# Patient Record
Sex: FEMALE | Race: WHITE | NOT HISPANIC OR LATINO | Employment: OTHER | URBAN - METROPOLITAN AREA
[De-identification: names, ages, dates, MRNs, and addresses within clinical notes are randomized per-mention and may not be internally consistent; named-entity substitution may affect disease eponyms.]

---

## 2024-01-19 ENCOUNTER — APPOINTMENT (OUTPATIENT)
Dept: RADIOLOGY | Facility: HOSPITAL | Age: 54
End: 2024-01-19
Payer: COMMERCIAL

## 2024-01-19 ENCOUNTER — HOSPITAL ENCOUNTER (EMERGENCY)
Facility: HOSPITAL | Age: 54
Discharge: HOME | End: 2024-01-19
Attending: EMERGENCY MEDICINE
Payer: COMMERCIAL

## 2024-01-19 VITALS
OXYGEN SATURATION: 97 % | BODY MASS INDEX: 23.34 KG/M2 | TEMPERATURE: 97.7 F | HEIGHT: 64 IN | DIASTOLIC BLOOD PRESSURE: 71 MMHG | WEIGHT: 136.69 LBS | SYSTOLIC BLOOD PRESSURE: 115 MMHG | RESPIRATION RATE: 18 BRPM | HEART RATE: 96 BPM

## 2024-01-19 DIAGNOSIS — M25.562 ACUTE PAIN OF LEFT KNEE: Primary | ICD-10-CM

## 2024-01-19 PROCEDURE — 99283 EMERGENCY DEPT VISIT LOW MDM: CPT | Performed by: EMERGENCY MEDICINE

## 2024-01-19 PROCEDURE — 73564 X-RAY EXAM KNEE 4 OR MORE: CPT | Mod: LT

## 2024-01-19 PROCEDURE — 2500000001 HC RX 250 WO HCPCS SELF ADMINISTERED DRUGS (ALT 637 FOR MEDICARE OP): Performed by: EMERGENCY MEDICINE

## 2024-01-19 RX ORDER — IBUPROFEN 600 MG/1
600 TABLET ORAL ONCE
Status: COMPLETED | OUTPATIENT
Start: 2024-01-19 | End: 2024-01-19

## 2024-01-19 RX ADMIN — IBUPROFEN 600 MG: 600 TABLET, FILM COATED ORAL at 16:17

## 2024-01-19 ASSESSMENT — PAIN DESCRIPTION - PAIN TYPE: TYPE: ACUTE PAIN

## 2024-01-19 ASSESSMENT — PAIN DESCRIPTION - DESCRIPTORS: DESCRIPTORS: ACHING;TENDER

## 2024-01-19 ASSESSMENT — PAIN SCALES - GENERAL
PAINLEVEL_OUTOF10: 6
PAINLEVEL_OUTOF10: 6

## 2024-01-19 ASSESSMENT — LIFESTYLE VARIABLES
EVER FELT BAD OR GUILTY ABOUT YOUR DRINKING: NO
REASON UNABLE TO ASSESS: NO
HAVE PEOPLE ANNOYED YOU BY CRITICIZING YOUR DRINKING: NO
EVER HAD A DRINK FIRST THING IN THE MORNING TO STEADY YOUR NERVES TO GET RID OF A HANGOVER: NO
HAVE YOU EVER FELT YOU SHOULD CUT DOWN ON YOUR DRINKING: NO

## 2024-01-19 ASSESSMENT — PAIN - FUNCTIONAL ASSESSMENT
PAIN_FUNCTIONAL_ASSESSMENT: 0-10
PAIN_FUNCTIONAL_ASSESSMENT: 0-10

## 2024-01-19 ASSESSMENT — COLUMBIA-SUICIDE SEVERITY RATING SCALE - C-SSRS
1. IN THE PAST MONTH, HAVE YOU WISHED YOU WERE DEAD OR WISHED YOU COULD GO TO SLEEP AND NOT WAKE UP?: NO
6. HAVE YOU EVER DONE ANYTHING, STARTED TO DO ANYTHING, OR PREPARED TO DO ANYTHING TO END YOUR LIFE?: NO
2. HAVE YOU ACTUALLY HAD ANY THOUGHTS OF KILLING YOURSELF?: NO

## 2024-01-19 ASSESSMENT — PAIN DESCRIPTION - ORIENTATION: ORIENTATION: LEFT

## 2024-01-19 ASSESSMENT — PAIN DESCRIPTION - LOCATION: LOCATION: KNEE

## 2024-01-19 NOTE — ED PROVIDER NOTES
HPI   Chief Complaint   Patient presents with    Knee Pain     No trauma / injury       The patient is a 53-year-old female who presents for evaluation of left knee pain.  The patient states that she has been having pain to the medial aspect of her left knee for the past week.  She initially did not recall any specific injury or specific sudden onset of the pain but later remembers that a week ago she stepped awkwardly over her cat to avoid stepping on her cat.  She thinks now that she may have twisted her knee somewhat.  The pain has been isolated to the medial aspect of the knee.  She states that the pain is worse when she is up walking around and when she bends the knee.  She is also noticed the pain is worse when she tries to straighten the knee.  She denies any pain above or below the knee.  No redness or swelling or increased warmth.  No fevers.  No other concerns or complaints.                          [place] Coma Scale Score: 15                  Patient History   Past Medical History:   Diagnosis Date    Acute bronchitis, unspecified 12/03/2018    Acute bronchitis due to infection    Acute upper respiratory infection, unspecified 09/11/2017    Upper respiratory infection, acute    Calculus of gallbladder without cholecystitis without obstruction 01/03/2018    Calculus of gallbladder without cholecystitis without obstruction    Cervicalgia 05/27/2016    Neck pain    Chronic obstructive pulmonary disease, unspecified (CMS/[facility]) 12/11/2018    Chronic obstructive pulmonary disease    Chronic rhinitis 06/04/2013    Rhinitis    Chronic sinusitis, unspecified     Sinusitis    Diarrhea, unspecified 08/25/2016    Acute diarrhea    Headache, unspecified 02/26/2013    Headache    Other conditions influencing health status 06/04/2013    Liver, Stomach, Or Bowel Disease    Other conditions influencing health status     History Of ___ Previous Pregnancies    Other conditions influencing health status     History Of ___  Previous Pregnancies    Other conditions influencing health status     Nephrolithiasis    Other intervertebral disc displacement, lumbar region     Lumbar herniated disc    Perforation of intestine (nontraumatic) (CMS/HCC)     Colon perforation    Periapical abscess without sinus 01/07/2019    Dental infection    Personal history of diseases of the skin and subcutaneous tissue     History of hyperkeratosis of skin    Personal history of other diseases of the digestive system 12/12/2013    History of constipation    Personal history of other diseases of the digestive system 06/04/2013    History of ulcerative colitis    Personal history of other diseases of the musculoskeletal system and connective tissue     History of low back pain    Personal history of other diseases of the musculoskeletal system and connective tissue 05/04/2013    History of osteoarthritis    Personal history of other diseases of the respiratory system 07/13/2017    History of acute pharyngitis    Personal history of other diseases of the respiratory system 12/03/2018    History of acute bronchitis with bronchospasm    Personal history of other diseases of the respiratory system 02/06/2016    History of acute bronchitis    Personal history of other infectious and parasitic diseases 08/26/2014    History of herpes simplex infection    Personal history of other mental and behavioral disorders     History of depression    Personal history of other mental and behavioral disorders 05/04/2013    History of obsessive compulsive disorder    Personal history of other specified conditions     History of fatigue    Personal history of other specified conditions 06/04/2013    History of abnormal weight loss    Rheumatoid arthritis, unspecified (CMS/HCC)     Rheumatoid arthritis    Urinary tract infection, site not specified 11/28/2017    Acute UTI     Past Surgical History:   Procedure Laterality Date    BREAST LUMPECTOMY  09/12/2013    Breast Surgery  Lumpectomy     SECTION, CLASSIC  10/07/2013     Section    CHOLECYSTECTOMY  2018    Cholecystectomy    COLON SURGERY  2013    Colon Surgery    EXPLORATORY LAPAROTOMY  10/07/2013    Exploratory Laparotomy    HYSTEROSCOPY  2013    Hysteroscopy With Endometrial Ablation    OTHER SURGICAL HISTORY  2013    Tubal Ligation During  Section    OTHER SURGICAL HISTORY  2013    Axillary Lymphadenectomy    OTHER SURGICAL HISTORY  10/07/2013    Biopsy Endometrial, Without Cervical Dilation    OTHER SURGICAL HISTORY  2013    Gastrointestinal Surgery    SMALL INTESTINE SURGERY  10/07/2013    Small Bowel Resection    TONSILLECTOMY  2013    Tonsillectomy With Adenoidectomy    VARICOSE VEIN SURGERY  2013    Varicose Vein Ligation     No family history on file.  Social History     Tobacco Use    Smoking status: Not on file    Smokeless tobacco: Not on file   Substance Use Topics    Alcohol use: Not on file    Drug use: Not on file       Physical Exam   ED Triage Vitals [24 1422]   Temp Heart Rate Respirations BP   36.5 °C (97.7 °F) 94 20 123/79      Pulse Ox Temp Source Heart Rate Source Patient Position   98 % Oral Brachial Sitting      BP Location FiO2 (%)     Right arm --       Physical Exam  Vitals and nursing note reviewed.   Constitutional:       General: She is not in acute distress.     Appearance: Normal appearance.   HENT:      Head: Normocephalic and atraumatic.      Mouth/Throat:      Mouth: Mucous membranes are moist.      Pharynx: Oropharynx is clear. No oropharyngeal exudate or posterior oropharyngeal erythema.   Eyes:      Extraocular Movements: Extraocular movements intact.      Conjunctiva/sclera: Conjunctivae normal.      Pupils: Pupils are equal, round, and reactive to light.   Cardiovascular:      Rate and Rhythm: Normal rate and regular rhythm.      Heart sounds: No murmur heard.     Comments: Pulses are palpable at both dorsalis pedis  locations.  Pulmonary:      Effort: Pulmonary effort is normal.      Breath sounds: Normal breath sounds. No wheezing, rhonchi or rales.   Abdominal:      General: Abdomen is flat. There is no distension.      Palpations: Abdomen is soft.      Tenderness: There is no abdominal tenderness.   Musculoskeletal:      Cervical back: Normal range of motion and neck supple. No rigidity or tenderness.      Comments: Examination of left lower extremity reveals focal tenderness with palpation to the medial aspect of the knee.  There is no thigh or calf tenderness.  Examination of the knee reveals no redness or increased warmth.  No swelling or palpable effusion.  The patient is most comfortable with her knee flexed at about 80 degrees of flexion.  She allows flexion to about 120 degrees of flexion but does not allow full extension due to pain.  There is no ligamentous instability detected.  The extensor mechanism is intact.   Lymphadenopathy:      Cervical: No cervical adenopathy.   Skin:     General: Skin is warm and dry.      Findings: No rash.   Neurological:      Mental Status: She is alert and oriented to person, place, and time.      Cranial Nerves: No cranial nerve deficit.      Sensory: No sensory deficit.      Motor: No weakness.   Psychiatric:         Mood and Affect: Mood normal.         Behavior: Behavior normal.         ED Course & MDM   Diagnoses as of 01/19/24 8147   Acute pain of left knee       Medical Decision Making  The patient was given a dose of ibuprofen.  She was sent for x-rays of the left knee.  X-rays were read by the radiologist and show no acute pathologic findings.    Findings are consistent with nonspecific left knee pain.  I do not suspect septic arthritis.  There is no indication of an infectious etiology or vascular compromise.  Based on the location of her pain and the history of awkwardly stepping over her cat, I suspect that she may have a MCL sprain.  The patient was placed in knee  immobilizer brace to be worn for comfort.  She does not feel that she needs crutches.  She will be discharged home with instructions to take ibuprofen over-the-counter.  I recommended rest, ice and elevation.  She was given an orthopedic referral for follow-up in 3 to 4 days and encouraged to return if symptoms worsen in any way.        Procedure  Procedures     Immanuel Aiken, DO  01/19/24 4909

## 2024-01-19 NOTE — DISCHARGE INSTRUCTIONS
Rest, ice and elevation.    Wear knee immobilizer brace for comfort.    Ibuprofen over-the-counter.    Follow-up with the specialist below in 3 to 4 days.

## 2025-02-17 ENCOUNTER — APPOINTMENT (OUTPATIENT)
Dept: PRIMARY CARE | Facility: CLINIC | Age: 55
End: 2025-02-17
Payer: COMMERCIAL

## 2025-02-17 VITALS
SYSTOLIC BLOOD PRESSURE: 126 MMHG | BODY MASS INDEX: 22.36 KG/M2 | HEIGHT: 64 IN | DIASTOLIC BLOOD PRESSURE: 70 MMHG | WEIGHT: 131 LBS

## 2025-02-17 DIAGNOSIS — Z13.820 ENCOUNTER FOR SCREENING FOR OSTEOPOROSIS: ICD-10-CM

## 2025-02-17 DIAGNOSIS — Z12.11 ENCOUNTER FOR SCREENING COLONOSCOPY: ICD-10-CM

## 2025-02-17 DIAGNOSIS — Z13.220 LIPID SCREENING: ICD-10-CM

## 2025-02-17 DIAGNOSIS — K21.9 GASTROESOPHAGEAL REFLUX DISEASE WITHOUT ESOPHAGITIS: ICD-10-CM

## 2025-02-17 DIAGNOSIS — M77.8 TENDINITIS OF THUMB: ICD-10-CM

## 2025-02-17 DIAGNOSIS — S62.002D: ICD-10-CM

## 2025-02-17 DIAGNOSIS — Z78.0 POSTMENOPAUSAL: ICD-10-CM

## 2025-02-17 DIAGNOSIS — C50.411 MALIGNANT NEOPLASM OF UPPER-OUTER QUADRANT OF RIGHT FEMALE BREAST, UNSPECIFIED ESTROGEN RECEPTOR STATUS: ICD-10-CM

## 2025-02-17 DIAGNOSIS — Z71.89 CARDIAC RISK COUNSELING: ICD-10-CM

## 2025-02-17 DIAGNOSIS — M19.90 ARTHRITIS: Primary | ICD-10-CM

## 2025-02-17 DIAGNOSIS — Z00.00 HEALTHCARE MAINTENANCE: ICD-10-CM

## 2025-02-17 DIAGNOSIS — M05.711 RHEUMATOID ARTHRITIS INVOLVING RIGHT SHOULDER WITH POSITIVE RHEUMATOID FACTOR (MULTI): ICD-10-CM

## 2025-02-17 DIAGNOSIS — F17.200 SMOKING: ICD-10-CM

## 2025-02-17 DIAGNOSIS — M81.0 AGE RELATED OSTEOPOROSIS: ICD-10-CM

## 2025-02-17 DIAGNOSIS — R53.83 OTHER FATIGUE: ICD-10-CM

## 2025-02-17 PROCEDURE — 99204 OFFICE O/P NEW MOD 45 MIN: CPT | Performed by: INTERNAL MEDICINE

## 2025-02-17 PROCEDURE — 3008F BODY MASS INDEX DOCD: CPT | Performed by: INTERNAL MEDICINE

## 2025-02-17 PROCEDURE — 4004F PT TOBACCO SCREEN RCVD TLK: CPT | Performed by: INTERNAL MEDICINE

## 2025-02-17 RX ORDER — OMEPRAZOLE 40 MG/1
40 CAPSULE, DELAYED RELEASE ORAL
Qty: 30 CAPSULE | Refills: 0 | Status: SHIPPED | OUTPATIENT
Start: 2025-02-17 | End: 2026-02-17

## 2025-02-17 ASSESSMENT — ENCOUNTER SYMPTOMS
LOSS OF SENSATION IN FEET: 0
DEPRESSION: 0
OCCASIONAL FEELINGS OF UNSTEADINESS: 0

## 2025-02-18 ENCOUNTER — HOSPITAL ENCOUNTER (OUTPATIENT)
Dept: RADIOLOGY | Facility: CLINIC | Age: 55
Discharge: HOME | End: 2025-02-18
Payer: COMMERCIAL

## 2025-02-18 DIAGNOSIS — Z13.820 ENCOUNTER FOR SCREENING FOR OSTEOPOROSIS: ICD-10-CM

## 2025-02-18 DIAGNOSIS — M81.0 AGE RELATED OSTEOPOROSIS: ICD-10-CM

## 2025-02-18 PROCEDURE — 77080 DXA BONE DENSITY AXIAL: CPT | Performed by: RADIOLOGY

## 2025-02-18 PROCEDURE — 77080 DXA BONE DENSITY AXIAL: CPT

## 2025-02-18 NOTE — PROGRESS NOTES
"Subjective   Patient ID: Marlys Toussaint is a 54 y.o. female who presents for Follow-up (Various conditions).    Marlys Toussaint today came here to establish new physician.  Dr. Barry ______ retired, so she came to see me.  She has multiple medical issues:  1. Epigastric pain, GERD symptoms.  2. Arthritis, bone and joint arthritis pain.  3. She has left thumb pain, on and off it is hurting.  4. Arthritis is bothering her.  She came for follow-up on various conditions.    IMMUNIZATION: The patient will need all the shots.    I have personally reviewed the patient's Past Medical History, Medications, Allergies, Social History, and Family History in the EMR.    Review of Systems   All other systems reviewed and are negative.  The patient never had a stroke.  No heart attack.  No diabetes.    Objective   /70   Ht 1.626 m (5' 4\")   Wt 59.4 kg (131 lb)   BMI 22.49 kg/m²     Physical Exam  Vitals reviewed.   HENT:      Right Ear: Tympanic membrane, ear canal and external ear normal.      Left Ear: Tympanic membrane, ear canal and external ear normal.   Eyes:      General: No scleral icterus.     Pupils: Pupils are equal, round, and reactive to light.   Neck:      Vascular: No carotid bruit.   Cardiovascular:      Heart sounds: Normal heart sounds, S1 normal and S2 normal. No murmur heard.     No friction rub.   Pulmonary:      Effort: Pulmonary effort is normal.      Breath sounds: Normal breath sounds and air entry.   Chest:      Comments: BREAST: Deferred by the patient.  Abdominal:      Palpations: There is no hepatomegaly, splenomegaly or mass.   Genitourinary:     Comments: VAGINAL: Deferred by the patient.  RECTAL: Deferred by the patient.  Musculoskeletal:         General: No swelling or deformity. Normal range of motion.      Cervical back: Neck supple.      Right lower leg: No edema.      Left lower leg: No edema.   Lymphadenopathy:      Cervical: No cervical adenopathy.      Upper Body:      Right upper body: No " axillary adenopathy.      Left upper body: No axillary adenopathy.      Lower Body: No right inguinal adenopathy. No left inguinal adenopathy.   Neurological:      Mental Status: She is oriented to person, place, and time.      Cranial Nerves: Cranial nerves 2-12 are intact. No cranial nerve deficit.      Sensory: No sensory deficit.      Motor: Motor function is intact. No weakness.      Gait: Gait is intact.      Deep Tendon Reflexes: Reflexes normal.   Psychiatric:         Mood and Affect: Mood normal. Mood is not anxious or depressed. Affect is not angry.         Behavior: Behavior is not agitated.         Thought Content: Thought content normal.         Judgment: Judgment normal.     LAB WORK: Laboratory testing discussed.    Assessment/Plan   Problem List Items Addressed This Visit    None  Visit Diagnoses         Codes    Arthritis    -  Primary M19.90    Other fatigue     R53.83    Relevant Orders    CBC    Thyroid Stimulating Hormone    Urinalysis with Reflex Microscopic    Vitamin B12    Lipid screening     Z13.220    Relevant Orders    Comprehensive Metabolic Panel    Lipid Panel    Encounter for screening colonoscopy     Z12.11    Relevant Orders    Colonoscopy Screening; Average Risk Patient    Tendinitis of thumb     M77.8    Relevant Orders    Arthritis Panel (CMS)    Referral to Orthopaedic Surgery    Gastroesophageal reflux disease without esophagitis     K21.9    Relevant Medications    omeprazole (PriLOSEC) 40 mg DR capsule    Healthcare maintenance     Z00.00    Relevant Orders    CT cardiac scoring wo IV contrast    Encounter for screening for osteoporosis     Z13.820    Relevant Orders    XR DEXA bone density    Age related osteoporosis     M81.0    Relevant Orders    XR DEXA bone density    De Quervain's fracture of left wrist with routine healing, subsequent encounter     S62.002D    Postmenopausal     Z78.0    Cardiac risk counseling     Z71.89    Smoking     F17.200        1. Epigastric  pain, GERD.  Prilosec started.  2. Bone and joint arthritis.  Monitor.  3. Left thumb de Quervain arthritis.  Gave her splint.  Referred to Orthopedic.  4. Postmenopausal.  Bone density ordered.  4. Cardiac risk.  Cardiac calcium score.  5. Skin moles and freckles.  Seeing specialist.  6. Arthritis panel.  Complete blood work ordered.  7. I shall see her back in a week after the tests.  8. Smoking.  Today, I have discussed with the patient about smoking cessation in detail.  Discussed the bad consequences of smoking, which can even result into death ultimately.  I advised her to quit smoking. I also offered help in the form of counseling, Nicoderm Patches, Zyban prescription drug, and hypnosis, and discussed the different pros and cons of trying this therapy.  The patient made the promise that she will make a serious attempt.  If she decides to have prescription medication Zyban, she will discuss with me.  Advised to quit.  9. I shall continue to follow.    Yadi Attestation  By signing my name below, I, Yadi Cifuentes attest that this documentation has been prepared under the direction and in the presence of Tu Lang MD.     All medical record entries made by the yadi were personally dictated by me I have reviewed the chart and agree the record accurately reflects my personal performance of his history physical examination and management

## 2025-02-19 PROBLEM — M05.711 RHEUMATOID ARTHRITIS INVOLVING RIGHT SHOULDER WITH POSITIVE RHEUMATOID FACTOR (MULTI): Status: ACTIVE | Noted: 2025-02-19

## 2025-02-19 PROBLEM — C50.411 MALIGNANT NEOPLASM OF UPPER-OUTER QUADRANT OF RIGHT FEMALE BREAST, UNSPECIFIED ESTROGEN RECEPTOR STATUS: Status: ACTIVE | Noted: 2025-02-19

## 2025-02-24 ENCOUNTER — APPOINTMENT (OUTPATIENT)
Dept: PRIMARY CARE | Facility: CLINIC | Age: 55
End: 2025-02-24
Payer: COMMERCIAL

## 2025-02-27 ENCOUNTER — HOSPITAL ENCOUNTER (OUTPATIENT)
Dept: RADIOLOGY | Facility: CLINIC | Age: 55
Discharge: HOME | End: 2025-02-27
Payer: COMMERCIAL

## 2025-02-27 ENCOUNTER — OFFICE VISIT (OUTPATIENT)
Dept: ORTHOPEDIC SURGERY | Facility: CLINIC | Age: 55
End: 2025-02-27
Payer: COMMERCIAL

## 2025-02-27 DIAGNOSIS — M77.8 TENDINITIS OF THUMB: Primary | ICD-10-CM

## 2025-02-27 DIAGNOSIS — M79.645 THUMB PAIN, LEFT: ICD-10-CM

## 2025-02-27 DIAGNOSIS — M19.049 CMC ARTHRITIS: ICD-10-CM

## 2025-02-27 PROCEDURE — 73140 X-RAY EXAM OF FINGER(S): CPT | Mod: LT

## 2025-02-27 PROCEDURE — 2500000004 HC RX 250 GENERAL PHARMACY W/ HCPCS (ALT 636 FOR OP/ED): Performed by: ORTHOPAEDIC SURGERY

## 2025-02-27 RX ORDER — TRIAMCINOLONE ACETONIDE 40 MG/ML
40 INJECTION, SUSPENSION INTRA-ARTICULAR; INTRAMUSCULAR
Status: COMPLETED | OUTPATIENT
Start: 2025-02-27 | End: 2025-02-27

## 2025-02-27 RX ORDER — LIDOCAINE HYDROCHLORIDE 10 MG/ML
1 INJECTION, SOLUTION INFILTRATION; PERINEURAL
Status: COMPLETED | OUTPATIENT
Start: 2025-02-27 | End: 2025-02-27

## 2025-02-27 RX ADMIN — BETAMETHASONE DIPROPIONATE 40 MG: 0.5 OINTMENT TOPICAL at 22:28

## 2025-02-27 RX ADMIN — LIDOCAINE HYDROCHLORIDE 1 ML: 10 INJECTION, SOLUTION INFILTRATION; PERINEURAL at 22:28

## 2025-02-27 ASSESSMENT — ENCOUNTER SYMPTOMS
OCCASIONAL FEELINGS OF UNSTEADINESS: 0
LOSS OF SENSATION IN FEET: 0
DEPRESSION: 0

## 2025-02-27 ASSESSMENT — PAIN - FUNCTIONAL ASSESSMENT: PAIN_FUNCTIONAL_ASSESSMENT: 0-10

## 2025-02-27 ASSESSMENT — PAIN SCALES - GENERAL: PAINLEVEL_OUTOF10: 6

## 2025-02-28 NOTE — PROGRESS NOTES
History of Present Illness:  Chief Complaint   Patient presents with    Left Hand - Pain     Left thumb pain       The patient presents today for evaluation of left basilar thumb pain.  Symptoms began worsening over the past year.  Pain is worse with gripping, pinching and twisting.  The following treatments have been attempted: Activity modifications.  No numbness or tingling.      Past Medical History:   Diagnosis Date    Acute bronchitis, unspecified 12/03/2018    Acute bronchitis due to infection    Acute upper respiratory infection, unspecified 09/11/2017    Upper respiratory infection, acute    Breast cancer (Multi)     Calculus of gallbladder without cholecystitis without obstruction 01/03/2018    Calculus of gallbladder without cholecystitis without obstruction    Cervicalgia 05/27/2016    Neck pain    Chronic obstructive pulmonary disease, unspecified 12/11/2018    Chronic obstructive pulmonary disease    Chronic rhinitis 06/04/2013    Rhinitis    Chronic sinusitis, unspecified     Sinusitis    Colon perforation (Multi)     Diarrhea, unspecified 08/25/2016    Acute diarrhea    Headache, unspecified 02/26/2013    Headache    Other conditions influencing health status 06/04/2013    Liver, Stomach, Or Bowel Disease    Other conditions influencing health status     History Of ___ Previous Pregnancies    Other conditions influencing health status     History Of ___ Previous Pregnancies    Other conditions influencing health status     Nephrolithiasis    Other intervertebral disc displacement, lumbar region     Lumbar herniated disc    Perforation of intestine (nontraumatic)     Colon perforation    Periapical abscess without sinus 01/07/2019    Dental infection    Personal history of diseases of the skin and subcutaneous tissue     History of hyperkeratosis of skin    Personal history of other diseases of the digestive system 12/12/2013    History of constipation    Personal history of other diseases of the  digestive system 06/04/2013    History of ulcerative colitis    Personal history of other diseases of the musculoskeletal system and connective tissue     History of low back pain    Personal history of other diseases of the musculoskeletal system and connective tissue 05/04/2013    History of osteoarthritis    Personal history of other diseases of the respiratory system 07/13/2017    History of acute pharyngitis    Personal history of other diseases of the respiratory system 12/03/2018    History of acute bronchitis with bronchospasm    Personal history of other diseases of the respiratory system 02/06/2016    History of acute bronchitis    Personal history of other infectious and parasitic diseases 08/26/2014    History of herpes simplex infection    Personal history of other mental and behavioral disorders     History of depression    Personal history of other mental and behavioral disorders 05/04/2013    History of obsessive compulsive disorder    Personal history of other specified conditions     History of fatigue    Personal history of other specified conditions 06/04/2013    History of abnormal weight loss    Postmenopausal     Rheumatoid arthritis, unspecified     Rheumatoid arthritis    Urinary tract infection, site not specified 11/28/2017    Acute UTI       Medication Documentation Review Audit       Reviewed by Sharon Welch CMA (Medical Assistant) on 02/27/25 at 0927      Medication Order Taking? Sig Documenting Provider Last Dose Status   omeprazole (PriLOSEC) 40 mg DR capsule [id number]  Take 1 capsule (40 mg) by mouth once daily in the morning. Take before meals. Do not crush or chew. Tu Lang MD  Active                    No Known Allergies    Social History     Socioeconomic History    Marital status:      Spouse name: Not on file    Number of children: 3    Years of education: Not on file    Highest education level: Not on file   Occupational History    Occupation: Retired    Tobacco Use    Smoking status: Every Day     Types: Cigarettes    Smokeless tobacco: Never    Tobacco comments:     half-a-pack-a-day.   Substance and Sexual Activity    Alcohol use: Yes    Drug use: Not on file    Sexual activity: Not on file   Other Topics Concern    Not on file   Social History Narrative    Not on file     Social Drivers of Health     Financial Resource Strain: Not at Risk (2025)    Received from [employer]    Financial Resource Strain     Financial Resource Strain: 1   Food Insecurity: Not on File (2024)    Received from [employer]    Food Insecurity     Food: 0   Transportation Needs: Not at Risk (2025)    Received from [employer]    Transportation Needs     Transportation: 1   Physical Activity: Not on File (2021)    Received from [employer], [employer]    Physical Activity     Physical Activity: 0   Stress: At Risk (2025)    Received from [employer]    Stress     Stress: 2   Social Connections: Not at Risk (2025)    Received from [employer]    Social Connections     Connectedness: 1   Intimate Partner Violence: Not on file   Housing Stability: Not at Risk (2025)    Received from [employer]    Housing Stability     [place]       Past Surgical History:   Procedure Laterality Date    BREAST LUMPECTOMY  2013    Breast Surgery Lumpectomy     SECTION, CLASSIC  10/07/2013     Section    CHOLECYSTECTOMY  2018    Cholecystectomy    COLON SURGERY  2013    Colon Surgery    EXPLORATORY LAPAROTOMY  10/07/2013    Exploratory Laparotomy    HYSTEROSCOPY  2013    Hysteroscopy With Endometrial Ablation    OTHER SURGICAL HISTORY  2013    Tubal Ligation During  Section    OTHER SURGICAL HISTORY  2013    Axillary Lymphadenectomy    OTHER SURGICAL HISTORY  10/07/2013    Biopsy Endometrial, Without Cervical Dilation    OTHER SURGICAL HISTORY  2013    Gastrointestinal Surgery    SMALL INTESTINE SURGERY  10/07/2013    Small Bowel Resection     TONSILLECTOMY  04/08/2013    Tonsillectomy With Adenoidectomy    VARICOSE VEIN SURGERY  04/08/2013    Varicose Vein Ligation          Review of Systems   GENERAL: Negative for malaise, significant weight loss, fever  MUSCULOSKELETAL: see HPI  NEURO:  see HPI     Physical Examination:  Constitutional: Appears well-developed and well-nourished.  Head: Normocephalic and atraumatic.  Eyes: EOMI grossly  Cardiovascular: Intact distal pulses.   Respiratory: Effort normal. No respiratory distress.  Neurologic: Alert and oriented to person, place, and time.  Skin: Skin is warm and dry.  Hematologic / Lymphatic: No lymphedema, lymphangitis.  Psychiatric: normal mood and affect. Behavior is normal.   Musculoskeletal:  left wrist/hand:  No obvious swelling or masses  No thenar atrophy  No atrophy noted of hypothenar eminence   Negative Shanel's/Phalens  Sensation grossly intact to all digits  5/5 thumb Abduction/Finger Abduction  Tenderness about thumb cmc joint with positive CMC grind.  No tenderness about first dorsal compartment/thumb A1 pulley region  Radial pulse palpable  Good capillary refill     Radiographs: Left thumb radiographs ordered and available for my review/interpretation demonstrate significant thumb CMC degenerative changes.  There are also some degenerative changes in the thumb IP joint.     Assessment:  Patient with  left thumb  basilar joint arthritis.     Plan:  Diagnosis was reviewed with patient.  We discussed treatments of thumb CMC arthritis.  Non-operative modalities include symptomatic splinting, anti-inflammatories, activity modifications, hand therapy and corticosteroid injections.  We also discussed role of surgical intervention if conservative measures prove unsuccessful.    Patient wishes to proceed with corticosteroid injection as well as beginning bracing at this time.    Patient ID: Marlys Toussaint is a 54 y.o. female.    S Inj/Asp: L thumb CMC on 2/27/2025 10:28 PM  Indications: pain  Details:  25 G needle (dorsoradial) approach  Medications: 40 mg triamcinolone acetonide 40 mg/mL; 1 mL lidocaine 10 mg/mL (1 %)  Outcome: tolerated well, no immediate complications    Prepped with alcohol. Patient tolerated injection well. Band-aid applied to injection site.     Procedure, treatment alternatives, risks and benefits explained, specific risks discussed. Consent was given by the patient. Immediately prior to procedure a time out was called to verify the correct patient, procedure, equipment, support staff and site/side marked as required.

## 2025-03-03 ENCOUNTER — APPOINTMENT (OUTPATIENT)
Dept: PRIMARY CARE | Facility: CLINIC | Age: 55
End: 2025-03-03
Payer: COMMERCIAL

## 2025-03-03 VITALS
WEIGHT: 129 LBS | BODY MASS INDEX: 22.02 KG/M2 | HEIGHT: 64 IN | DIASTOLIC BLOOD PRESSURE: 74 MMHG | SYSTOLIC BLOOD PRESSURE: 122 MMHG

## 2025-03-03 DIAGNOSIS — K21.9 GASTROESOPHAGEAL REFLUX DISEASE WITHOUT ESOPHAGITIS: ICD-10-CM

## 2025-03-03 DIAGNOSIS — Z76.89 ENCOUNTER FOR SKIN CARE: ICD-10-CM

## 2025-03-03 DIAGNOSIS — Z23 NEED FOR INFLUENZA VACCINATION: ICD-10-CM

## 2025-03-03 DIAGNOSIS — F31.0 BIPOLAR AFFECTIVE DISORDER, CURRENT EPISODE HYPOMANIC (MULTI): ICD-10-CM

## 2025-03-03 DIAGNOSIS — Z00.00 HEALTHCARE MAINTENANCE: ICD-10-CM

## 2025-03-03 DIAGNOSIS — A60.09 HERPES GENITALIS IN WOMEN: ICD-10-CM

## 2025-03-03 DIAGNOSIS — F19.10: ICD-10-CM

## 2025-03-03 DIAGNOSIS — J18.9 PNEUMONIA DUE TO INFECTIOUS ORGANISM, UNSPECIFIED LATERALITY, UNSPECIFIED PART OF LUNG: Primary | ICD-10-CM

## 2025-03-03 DIAGNOSIS — F51.01 PRIMARY INSOMNIA: ICD-10-CM

## 2025-03-03 DIAGNOSIS — Z23 NEED FOR PNEUMOCOCCAL VACCINATION: ICD-10-CM

## 2025-03-03 DIAGNOSIS — G47.00 INSOMNIA, UNSPECIFIED TYPE: ICD-10-CM

## 2025-03-03 PROCEDURE — 3008F BODY MASS INDEX DOCD: CPT | Performed by: INTERNAL MEDICINE

## 2025-03-03 PROCEDURE — 90656 IIV3 VACC NO PRSV 0.5 ML IM: CPT | Performed by: INTERNAL MEDICINE

## 2025-03-03 PROCEDURE — G0009 ADMIN PNEUMOCOCCAL VACCINE: HCPCS | Performed by: INTERNAL MEDICINE

## 2025-03-03 PROCEDURE — G0439 PPPS, SUBSEQ VISIT: HCPCS | Performed by: INTERNAL MEDICINE

## 2025-03-03 PROCEDURE — 90677 PCV20 VACCINE IM: CPT | Performed by: INTERNAL MEDICINE

## 2025-03-03 PROCEDURE — G0008 ADMIN INFLUENZA VIRUS VAC: HCPCS | Performed by: INTERNAL MEDICINE

## 2025-03-03 PROCEDURE — 99213 OFFICE O/P EST LOW 20 MIN: CPT | Performed by: INTERNAL MEDICINE

## 2025-03-03 RX ORDER — VALACYCLOVIR HYDROCHLORIDE 500 MG/1
500 TABLET, FILM COATED ORAL 2 TIMES DAILY
Qty: 6 TABLET | Refills: 0 | Status: SHIPPED | OUTPATIENT
Start: 2025-03-03 | End: 2025-03-06

## 2025-03-03 RX ORDER — TRAZODONE HYDROCHLORIDE 50 MG/1
50 TABLET ORAL NIGHTLY PRN
Qty: 30 TABLET | Refills: 0 | Status: SHIPPED | OUTPATIENT
Start: 2025-03-03 | End: 2026-03-03

## 2025-03-03 ASSESSMENT — ACTIVITIES OF DAILY LIVING (ADL)
DRESSING: INDEPENDENT
BATHING: INDEPENDENT
TAKING_MEDICATION: INDEPENDENT
DOING_HOUSEWORK: INDEPENDENT
GROCERY_SHOPPING: INDEPENDENT
MANAGING_FINANCES: INDEPENDENT

## 2025-03-03 ASSESSMENT — ENCOUNTER SYMPTOMS
LOSS OF SENSATION IN FEET: 0
OCCASIONAL FEELINGS OF UNSTEADINESS: 0
DEPRESSION: 0

## 2025-03-03 ASSESSMENT — PATIENT HEALTH QUESTIONNAIRE - PHQ9
1. LITTLE INTEREST OR PLEASURE IN DOING THINGS: NOT AT ALL
2. FEELING DOWN, DEPRESSED OR HOPELESS: NOT AT ALL
SUM OF ALL RESPONSES TO PHQ9 QUESTIONS 1 AND 2: 0

## 2025-03-04 LAB
ALBUMIN SERPL-MCNC: 4.9 G/DL (ref 3.6–5.1)
ALP SERPL-CCNC: 81 U/L (ref 37–153)
ALT SERPL-CCNC: 15 U/L (ref 6–29)
ANION GAP SERPL CALCULATED.4IONS-SCNC: 9 MMOL/L (CALC) (ref 7–17)
APPEARANCE UR: CLEAR
AST SERPL-CCNC: 15 U/L (ref 10–35)
BACTERIA #/AREA URNS HPF: ABNORMAL /HPF
BILIRUB SERPL-MCNC: 0.4 MG/DL (ref 0.2–1.2)
BILIRUB UR QL STRIP: NEGATIVE
BUN SERPL-MCNC: 14 MG/DL (ref 7–25)
CALCIUM SERPL-MCNC: 9.9 MG/DL (ref 8.6–10.4)
CAOX CRY #/AREA URNS HPF: ABNORMAL /HPF
CHLORIDE SERPL-SCNC: 103 MMOL/L (ref 98–110)
CHOLEST SERPL-MCNC: 246 MG/DL
CHOLEST/HDLC SERPL: 3.3 (CALC)
CO2 SERPL-SCNC: 27 MMOL/L (ref 20–32)
COLOR UR: YELLOW
CREAT SERPL-MCNC: 0.72 MG/DL (ref 0.5–1.03)
EGFRCR SERPLBLD CKD-EPI 2021: 99 ML/MIN/1.73M2
ERYTHROCYTE [DISTWIDTH] IN BLOOD BY AUTOMATED COUNT: 12.7 % (ref 11–15)
GLUCOSE SERPL-MCNC: 73 MG/DL (ref 65–139)
GLUCOSE UR QL STRIP: NEGATIVE
HCT VFR BLD AUTO: 47.8 % (ref 35–45)
HDLC SERPL-MCNC: 75 MG/DL
HGB BLD-MCNC: 15.5 G/DL (ref 11.7–15.5)
HGB UR QL STRIP: NEGATIVE
HYALINE CASTS #/AREA URNS LPF: ABNORMAL /LPF
KETONES UR QL STRIP: NEGATIVE
LDLC SERPL CALC-MCNC: 145 MG/DL (CALC)
LEUKOCYTE ESTERASE UR QL STRIP: ABNORMAL
MCH RBC QN AUTO: 29.4 PG (ref 27–33)
MCHC RBC AUTO-ENTMCNC: 32.4 G/DL (ref 32–36)
MCV RBC AUTO: 90.5 FL (ref 80–100)
NITRITE UR QL STRIP: NEGATIVE
NONHDLC SERPL-MCNC: 171 MG/DL (CALC)
PH UR STRIP: 6 [PH] (ref 5–8)
PLATELET # BLD AUTO: 356 THOUSAND/UL (ref 140–400)
PMV BLD REES-ECKER: 10.5 FL (ref 7.5–12.5)
POTASSIUM SERPL-SCNC: 4.7 MMOL/L (ref 3.5–5.3)
PROT SERPL-MCNC: 7.8 G/DL (ref 6.1–8.1)
PROT UR QL STRIP: NEGATIVE
RBC # BLD AUTO: 5.28 MILLION/UL (ref 3.8–5.1)
RBC #/AREA URNS HPF: ABNORMAL /HPF
SERVICE CMNT-IMP: ABNORMAL
SODIUM SERPL-SCNC: 139 MMOL/L (ref 135–146)
SP GR UR STRIP: 1.02 (ref 1–1.03)
SQUAMOUS #/AREA URNS HPF: ABNORMAL /HPF
TRIGL SERPL-MCNC: 131 MG/DL
TSH SERPL-ACNC: 1.1 MIU/L
VIT B12 SERPL-MCNC: 303 PG/ML (ref 200–1100)
WBC # BLD AUTO: 7.9 THOUSAND/UL (ref 3.8–10.8)
WBC #/AREA URNS HPF: ABNORMAL /HPF

## 2025-03-04 NOTE — PROGRESS NOTES
"Subjective   Patient ID: Marlys Toussaint is a 54 y.o. female who presents for Medicare Annual Wellness Visit Subsequent.    Ms. Marlys Toussaint is a 54-year-old white lady today came here for Medicare Wellness Visit and follow-up on other conditions.  Overall, she is enjoying good health.  She lives by herself and she looks after her mother and kids.    IMMUNIZATION: We gave her pneumonia and flu shot today.  Okay.    I have personally reviewed the patient's Past Medical History, Medications, Allergies, Social History, and Family History in the EMR.    Review of Systems   All other systems reviewed and are negative.  The patient never had a heart attack.  No stroke.  No diabetes or cancer.    Objective   /74   Ht 1.626 m (5' 4\")   Wt 58.5 kg (129 lb)   BMI 22.14 kg/m²     Physical Exam  Vitals reviewed.   HENT:      Right Ear: Tympanic membrane, ear canal and external ear normal.      Left Ear: Tympanic membrane, ear canal and external ear normal.   Eyes:      General: No scleral icterus.     Pupils: Pupils are equal, round, and reactive to light.   Neck:      Vascular: No carotid bruit.   Cardiovascular:      Heart sounds: Normal heart sounds, S1 normal and S2 normal. No murmur heard.     No friction rub.   Pulmonary:      Effort: Pulmonary effort is normal.      Breath sounds: Normal breath sounds and air entry.   Chest:      Comments: BREAST: Deferred by the patient.  Abdominal:      Palpations: There is no hepatomegaly, splenomegaly or mass.   Genitourinary:     Comments: VAGINAL: Deferred by the patient.  RECTAL: Deferred by the patient.  Musculoskeletal:         General: No swelling or deformity. Normal range of motion.      Cervical back: Neck supple.      Right lower leg: No edema.      Left lower leg: No edema.   Lymphadenopathy:      Cervical: No cervical adenopathy.      Upper Body:      Right upper body: No axillary adenopathy.      Left upper body: No axillary adenopathy.      Lower Body: No right " inguinal adenopathy. No left inguinal adenopathy.   Neurological:      Mental Status: She is oriented to person, place, and time.      Cranial Nerves: Cranial nerves 2-12 are intact. No cranial nerve deficit.      Sensory: No sensory deficit.      Motor: Motor function is intact. No weakness.      Gait: Gait is intact.      Deep Tendon Reflexes: Reflexes normal.   Psychiatric:         Mood and Affect: Mood normal. Mood is not anxious or depressed. Affect is not angry.         Behavior: Behavior is not agitated.         Thought Content: Thought content normal.         Judgment: Judgment normal.     LAB WORK: Laboratory testing discussed.    Assessment/Plan   Problem List Items Addressed This Visit    None  Visit Diagnoses         Codes    Pneumonia due to infectious organism, unspecified laterality, unspecified part of lung    -  Primary J18.9    Need for influenza vaccination     Z23    Relevant Orders    Flu vaccine, trivalent, preservative free, age 6 months and greater (Fluraix/Fluzone/Flulaval) (Completed)    Need for pneumococcal vaccination     Z23    Relevant Orders    Pneumococcal conjugate vaccine, 20-valent (PREVNAR 20) (Completed)    Herpes genitalis in women     A60.09    Relevant Medications    valACYclovir (Valtrex) 500 mg tablet    Primary insomnia     F51.01    Relevant Medications    traZODone (Desyrel) 50 mg tablet    Encounter for skin care     Z76.89    Relevant Orders    Referral to Dermatology    Healthcare maintenance     Z00.00    Relevant Orders    CT cardiac scoring wo IV contrast    Insomnia, unspecified type     G47.00    Gastroesophageal reflux disease without esophagitis     K21.9        1. Medicare Wellness Visit done.  2. Skin.   skin team.  3. Pneumonia and flu shot given.  4. New problem, insomnia.  I started her on trazodone.  5. GERD, on PPI.  6. Blood work ordered.  7. I shall see her back in four weeks.  8. Blood work discussed, some blood work is pending done today.  I shall  communicate with her soon.  9. The patient has a gynecologist.  She will do Pap test, mammogram and bone density there.  10. Genital herpes.  Valtrex given.  We will monitor kidney function.  11. Moles and freckles.  Seeing dermatologist.  12. Continue to follow.    Yadi Attestation  By signing my name below, INazanin Scribe attest that this documentation has been prepared under the direction and in the presence of Tu Lang MD.     All medical record entries made by the yadi were personally dictated by me I have reviewed the chart and agree the record accurately reflects my personal performance of his history physical examination and management

## 2025-03-05 PROBLEM — F31.0 BIPOLAR AFFECTIVE DISORDER, CURRENT EPISODE HYPOMANIC (MULTI): Status: ACTIVE | Noted: 2025-03-05

## 2025-03-05 PROBLEM — F19.10: Status: ACTIVE | Noted: 2025-03-05

## 2025-03-13 ENCOUNTER — HOSPITAL ENCOUNTER (OUTPATIENT)
Dept: RADIOLOGY | Facility: CLINIC | Age: 55
Discharge: HOME | End: 2025-03-13
Payer: COMMERCIAL

## 2025-03-13 VITALS — WEIGHT: 129 LBS | HEIGHT: 64 IN | BODY MASS INDEX: 22.02 KG/M2

## 2025-03-13 DIAGNOSIS — Z12.31 SCREENING MAMMOGRAM FOR BREAST CANCER: ICD-10-CM

## 2025-03-13 PROCEDURE — 77067 SCR MAMMO BI INCL CAD: CPT | Performed by: RADIOLOGY

## 2025-03-13 PROCEDURE — 77063 BREAST TOMOSYNTHESIS BI: CPT | Performed by: RADIOLOGY

## 2025-03-13 PROCEDURE — 77063 BREAST TOMOSYNTHESIS BI: CPT

## 2025-03-17 ENCOUNTER — APPOINTMENT (OUTPATIENT)
Dept: PRIMARY CARE | Facility: CLINIC | Age: 55
End: 2025-03-17
Payer: COMMERCIAL

## 2025-03-17 VITALS
BODY MASS INDEX: 22.2 KG/M2 | SYSTOLIC BLOOD PRESSURE: 126 MMHG | DIASTOLIC BLOOD PRESSURE: 70 MMHG | HEIGHT: 64 IN | WEIGHT: 130 LBS

## 2025-03-17 DIAGNOSIS — E78.2 MIXED HYPERLIPIDEMIA: ICD-10-CM

## 2025-03-17 DIAGNOSIS — K21.9 GASTROESOPHAGEAL REFLUX DISEASE WITHOUT ESOPHAGITIS: ICD-10-CM

## 2025-03-17 DIAGNOSIS — Z13.220 LIPID SCREENING: ICD-10-CM

## 2025-03-17 DIAGNOSIS — M54.40 ACUTE BILATERAL LOW BACK PAIN WITH SCIATICA, SCIATICA LATERALITY UNSPECIFIED: Primary | ICD-10-CM

## 2025-03-17 PROCEDURE — 99214 OFFICE O/P EST MOD 30 MIN: CPT | Performed by: INTERNAL MEDICINE

## 2025-03-17 PROCEDURE — G2211 COMPLEX E/M VISIT ADD ON: HCPCS | Performed by: INTERNAL MEDICINE

## 2025-03-17 PROCEDURE — 3008F BODY MASS INDEX DOCD: CPT | Performed by: INTERNAL MEDICINE

## 2025-03-17 PROCEDURE — 4004F PT TOBACCO SCREEN RCVD TLK: CPT | Performed by: INTERNAL MEDICINE

## 2025-03-17 RX ORDER — GABAPENTIN 100 MG/1
100 CAPSULE ORAL 3 TIMES DAILY
Qty: 90 CAPSULE | Refills: 5 | Status: SHIPPED | OUTPATIENT
Start: 2025-03-17 | End: 2025-09-13

## 2025-03-17 RX ORDER — CYCLOBENZAPRINE HCL 10 MG
10 TABLET ORAL NIGHTLY PRN
Qty: 30 TABLET | Refills: 0 | Status: SHIPPED | OUTPATIENT
Start: 2025-03-17 | End: 2025-05-16

## 2025-03-17 RX ORDER — NABUMETONE 750 MG/1
750 TABLET, FILM COATED ORAL 2 TIMES DAILY
Qty: 60 TABLET | Refills: 11 | Status: SHIPPED | OUTPATIENT
Start: 2025-03-17 | End: 2026-03-17

## 2025-03-18 NOTE — PROGRESS NOTES
"Subjective   Patient ID: Marlys Toussaint is a 54 y.o. female who presents for excruciating back pain.    Ms. Marlys Toussaint came today came here for excruciating back pain going in to the legs.  She works herself very hard, she just got a problem.  Feeling weak, tired, fatigued, and exhausted.  It is an old problem flared up.    I have personally reviewed the patient's Past Medical History, Medications, Allergies, Social History, and Family History in the EMR.    Review of Systems   All other systems reviewed and are negative.    Objective   /70   Ht 1.626 m (5' 4\")   Wt 59 kg (130 lb)   BMI 22.31 kg/m²     Physical Exam  Vitals reviewed.   Cardiovascular:      Heart sounds: Normal heart sounds, S1 normal and S2 normal. No murmur heard.     No friction rub.   Pulmonary:      Effort: Pulmonary effort is normal.      Breath sounds: Normal breath sounds and air entry.   Abdominal:      Palpations: There is no hepatomegaly, splenomegaly or mass.   Musculoskeletal:      Cervical back: Pain with movement present.      Right lower leg: No edema.      Left lower leg: No edema.      Comments: Straight leg raise limited.   Lymphadenopathy:      Lower Body: No right inguinal adenopathy. No left inguinal adenopathy.   Neurological:      Cranial Nerves: Cranial nerves 2-12 are intact.      Sensory: No sensory deficit.      Motor: Motor function is intact.      Deep Tendon Reflexes: Reflexes are normal and symmetric.     LAB WORK:  Laboratory testing discussed.    Assessment/Plan   Problem List Items Addressed This Visit    None  Visit Diagnoses         Codes    Acute bilateral low back pain with sciatica, sciatica laterality unspecified    -  Primary M54.40    Relevant Medications    nabumetone (Relafen) 750 mg tablet    gabapentin (Neurontin) 100 mg capsule    cyclobenzaprine (Flexeril) 10 mg tablet    Mixed hyperlipidemia     E78.2    Relevant Orders    Comprehensive Metabolic Panel    Thyroid Stimulating Hormone    Lipid " Panel    Gastroesophageal reflux disease without esophagitis     K21.9    Lipid screening     Z13.220        1. Back pain with radiculopathy.  Relafen, Flexeril, Lidoderm patch, gabapentin explained.  Referred to pain management.  2. GERD, on PPI.  She can take SSRI.  3. Insomnia.  Trazodone.  4. Follow up in a couple of weeks after test.  5. High cholesterol.  Diet and exercise.  Monitor.  She wants to try conservative.  We will check cholesterol in three months.  She might benefit from statin.  6. Polycythemia secondary to smoking.    Vimalibap Attestation  By signing my name below, I, Yadi Jones attest that this documentation has been prepared under the direction and in the presence of Tu Lang MD.     All medical record entries made by the yadi were personally dictated by me I have reviewed the chart and agree the record accurately reflects my personal performance of his history physical examination and management

## 2025-04-04 ENCOUNTER — APPOINTMENT (OUTPATIENT)
Dept: OBSTETRICS AND GYNECOLOGY | Facility: CLINIC | Age: 55
End: 2025-04-04
Payer: COMMERCIAL

## 2025-04-04 DIAGNOSIS — Z12.31 ENCOUNTER FOR SCREENING MAMMOGRAM FOR MALIGNANT NEOPLASM OF BREAST: ICD-10-CM

## 2025-04-04 DIAGNOSIS — Z11.51 SCREENING FOR HUMAN PAPILLOMAVIRUS (HPV): ICD-10-CM

## 2025-04-04 DIAGNOSIS — Z01.419 ENCOUNTER FOR ANNUAL ROUTINE GYNECOLOGICAL EXAMINATION: Primary | ICD-10-CM

## 2025-04-17 ENCOUNTER — OFFICE VISIT (OUTPATIENT)
Dept: PRIMARY CARE | Facility: CLINIC | Age: 55
End: 2025-04-17
Payer: COMMERCIAL

## 2025-04-17 VITALS
WEIGHT: 129 LBS | BODY MASS INDEX: 22.02 KG/M2 | HEIGHT: 64 IN | SYSTOLIC BLOOD PRESSURE: 122 MMHG | DIASTOLIC BLOOD PRESSURE: 70 MMHG

## 2025-04-17 DIAGNOSIS — K21.9 GASTROESOPHAGEAL REFLUX DISEASE WITHOUT ESOPHAGITIS: ICD-10-CM

## 2025-04-17 DIAGNOSIS — G47.00 INSOMNIA, UNSPECIFIED TYPE: ICD-10-CM

## 2025-04-17 DIAGNOSIS — M19.90 ARTHRITIS: ICD-10-CM

## 2025-04-17 DIAGNOSIS — K21.9 GASTROESOPHAGEAL REFLUX DISEASE WITHOUT ESOPHAGITIS: Primary | ICD-10-CM

## 2025-04-17 DIAGNOSIS — G62.9 NEUROPATHY: ICD-10-CM

## 2025-04-17 PROCEDURE — 3008F BODY MASS INDEX DOCD: CPT | Performed by: INTERNAL MEDICINE

## 2025-04-17 PROCEDURE — G2211 COMPLEX E/M VISIT ADD ON: HCPCS | Performed by: INTERNAL MEDICINE

## 2025-04-17 PROCEDURE — 99213 OFFICE O/P EST LOW 20 MIN: CPT | Performed by: INTERNAL MEDICINE

## 2025-04-17 RX ORDER — PANTOPRAZOLE SODIUM 40 MG/1
40 TABLET, DELAYED RELEASE ORAL
Qty: 90 TABLET | Refills: 1 | Status: SHIPPED | OUTPATIENT
Start: 2025-04-17

## 2025-04-17 RX ORDER — VONOPRAZAN FUMARATE 26.72 MG/1
20 TABLET ORAL DAILY
Qty: 90 TABLET | Refills: 1 | Status: SHIPPED | OUTPATIENT
Start: 2025-04-17 | End: 2025-04-17

## 2025-04-18 NOTE — PROGRESS NOTES
"Subjective   Patient ID: Marlys Toussaint is a 55 y.o. female who presents for Follow-up (Multiple medical issues).    Ms. Marlys Toussaint came today came here for multiple medical issues.  Acid reflux symptoms.  Prilosec ______ nothing is helping.  Upper abdominal pressure, not feeling good.  She came for follow-up on other conditions.  She is trying to quit smoking.    I have personally reviewed the patient's Past Medical History, Medications, Allergies, Social History, and Family History in the EMR.    Review of Systems   All other systems reviewed and are negative.    Objective   /70   Ht 1.626 m (5' 4\")   Wt 58.5 kg (129 lb)   BMI 22.14 kg/m²     Physical Exam  Vitals reviewed.   Cardiovascular:      Heart sounds: Normal heart sounds, S1 normal and S2 normal. No murmur heard.     No friction rub.   Pulmonary:      Effort: Pulmonary effort is normal.      Breath sounds: Normal breath sounds and air entry.   Abdominal:      Palpations: There is no hepatomegaly, splenomegaly or mass.      Tenderness: There is abdominal tenderness (diffuse).   Musculoskeletal:      Right lower leg: No edema.      Left lower leg: No edema.   Lymphadenopathy:      Lower Body: No right inguinal adenopathy. No left inguinal adenopathy.   Neurological:      Cranial Nerves: Cranial nerves 2-12 are intact.      Sensory: No sensory deficit.      Motor: Motor function is intact.      Deep Tendon Reflexes: Reflexes are normal and symmetric.     LAB WORK: Laboratory testing discussed.    Assessment/Plan   Problem List Items Addressed This Visit    None  Visit Diagnoses         Codes      Gastroesophageal reflux disease without esophagitis    -  Primary K21.9    Relevant Orders    Referral to Gastroenterology      Arthritis     M19.90      Neuropathy     G62.9      Insomnia, unspecified type     G47.00        1. Epigastric pain, GERD.  I changed to Voquezna.  She has already failed two drugs.  Monitor.  2. Arthritis, stable.  3. Neuropathy.  " Gabapentin.  4. Insomnia, trazodone.  5. Blood work discussed.  6. I shall see her in four to six weeks.  Referred to GI, Dr. Harmon for upper endoscopy.    Yadi Attestation  By signing my name below, I, Yadi Cifuentes attest that this documentation has been prepared under the direction and in the presence of Tu Lang MD.       All medical record entries made by the yadi were personally dictated by me I have reviewed the chart and agree the record accurately reflects my personal performance of his history physical examination and management

## 2025-05-30 ENCOUNTER — HOSPITAL ENCOUNTER (OUTPATIENT)
Dept: RADIOLOGY | Facility: CLINIC | Age: 55
Discharge: HOME | End: 2025-05-30
Payer: COMMERCIAL

## 2025-05-30 DIAGNOSIS — Z00.00 HEALTHCARE MAINTENANCE: ICD-10-CM

## 2025-05-30 PROCEDURE — 75571 CT HRT W/O DYE W/CA TEST: CPT

## 2025-06-04 ENCOUNTER — OFFICE VISIT (OUTPATIENT)
Dept: PRIMARY CARE | Facility: CLINIC | Age: 55
End: 2025-06-04
Payer: COMMERCIAL

## 2025-06-04 VITALS
HEIGHT: 64 IN | BODY MASS INDEX: 21.68 KG/M2 | WEIGHT: 127 LBS | SYSTOLIC BLOOD PRESSURE: 142 MMHG | DIASTOLIC BLOOD PRESSURE: 70 MMHG

## 2025-06-04 DIAGNOSIS — T78.40XA ALLERGY, INITIAL ENCOUNTER: ICD-10-CM

## 2025-06-04 DIAGNOSIS — R91.8 LUNG MASS: ICD-10-CM

## 2025-06-04 DIAGNOSIS — K21.9 GASTROESOPHAGEAL REFLUX DISEASE WITHOUT ESOPHAGITIS: Primary | ICD-10-CM

## 2025-06-04 DIAGNOSIS — G47.00 INSOMNIA, UNSPECIFIED TYPE: ICD-10-CM

## 2025-06-04 DIAGNOSIS — R93.1 ABNORMAL HEART SCORE CT: ICD-10-CM

## 2025-06-04 PROCEDURE — 3008F BODY MASS INDEX DOCD: CPT | Performed by: INTERNAL MEDICINE

## 2025-06-04 PROCEDURE — 99214 OFFICE O/P EST MOD 30 MIN: CPT | Performed by: INTERNAL MEDICINE

## 2025-06-04 RX ORDER — ALBUTEROL SULFATE 90 UG/1
2 INHALANT RESPIRATORY (INHALATION) EVERY 4 HOURS PRN
Qty: 6.7 G | Refills: 0 | Status: SHIPPED | OUTPATIENT
Start: 2025-06-04 | End: 2026-06-04

## 2025-06-04 RX ORDER — FLUTICASONE FUROATE 27.5 UG/1
1 SPRAY, METERED NASAL
Qty: 10 G | Refills: 0 | Status: SHIPPED | OUTPATIENT
Start: 2025-06-04 | End: 2026-06-04

## 2025-06-05 NOTE — PROGRESS NOTES
"Subjective   Patient ID: Marlys Toussaint is a 55 y.o. female who presents for Follow-up.    Marlys Toussaint today came here for multiple medical issues.  She is not feeling good.    Allergies, sore throat and nasal congestion.  She had a workup done.  She came for follow-up.  Feeling very weak.    I have personally reviewed the patient's Past Medical History, Medications, Allergies, Social History, and Family History in the EMR.    Review of Systems   All other systems reviewed and are negative.    Objective   /70   Ht 1.626 m (5' 4\")   Wt 57.6 kg (127 lb)   BMI 21.80 kg/m²     Physical Exam  Vitals reviewed.   Cardiovascular:      Heart sounds: Normal heart sounds, S1 normal and S2 normal. No murmur heard.     No friction rub.   Pulmonary:      Effort: Pulmonary effort is normal.      Breath sounds: Normal breath sounds and air entry.   Abdominal:      Palpations: There is no hepatomegaly, splenomegaly or mass.   Musculoskeletal:      Right lower leg: No edema.      Left lower leg: No edema.   Lymphadenopathy:      Lower Body: No right inguinal adenopathy. No left inguinal adenopathy.   Neurological:      Cranial Nerves: Cranial nerves 2-12 are intact.      Sensory: No sensory deficit.      Motor: Motor function is intact.      Deep Tendon Reflexes: Reflexes are normal and symmetric.     LAB WORK: Laboratory testing discussed.    Assessment/Plan   Problem List Items Addressed This Visit    None  Visit Diagnoses         Codes      Gastroesophageal reflux disease without esophagitis    -  Primary K21.9      Allergy, initial encounter     T78.40XA    Relevant Medications    fluticasone (Flonase Sensimist) 27.5 mcg/actuation nasal spray    albuterol (Proventil HFA) 90 mcg/actuation inhaler      Lung mass     R91.8    Relevant Orders    Referral to Pulmonology    Referral to Thoracic Surgery      Abnormal Heart Score CT     R93.1    Relevant Orders    Referral to Cardiology      Insomnia, unspecified type     G47.00      "   1. Allergic rhinitis and postnasal drip.  Claritin and Flonase.  2. Calcium score positive.  Referred to Cardiology.  3. Left upper lobe possible mass.  Smoker.  I referred to Pulmonary for bronchoscopic biopsy.  Monitor.  4. Smoking.  Today, I have discussed with the patient about smoking cessation in detail. Discussed the bad consequences of smoking, which can even result into death ultimately. I advised her to quit smoking. I also offered help in the form of counseling, Nicoderm Patches, Zyban prescription drug, and hypnosis, and discussed the different pros and cons of trying this therapy. The patient made the promise that she will make a serious attempt. If she decides to have prescription medication Zyban, she will discuss with me.  Six minutes.  5. GERD.  PPI.  6. Insomnia, on trazodone.  7. I shall see her back in about two to three weeks after about testing.    Yaid Attestation  By signing my name below, IJanine Scribe attest that this documentation has been prepared under the direction and in the presence of Tu Lang MD.     All medical record entries made by the yadi were personally dictated by me I have reviewed the chart and agree the record accurately reflects my personal performance of his history physical examination and management

## 2025-06-09 ENCOUNTER — APPOINTMENT (OUTPATIENT)
Dept: OPHTHALMOLOGY | Facility: CLINIC | Age: 55
End: 2025-06-09
Payer: COMMERCIAL

## 2025-06-09 DIAGNOSIS — H52.03 HYPEROPIA WITH PRESBYOPIA OF BOTH EYES: Primary | ICD-10-CM

## 2025-06-09 DIAGNOSIS — H25.13 AGE-RELATED NUCLEAR CATARACT OF BOTH EYES: ICD-10-CM

## 2025-06-09 DIAGNOSIS — T78.40XA ALLERGY, INITIAL ENCOUNTER: ICD-10-CM

## 2025-06-09 DIAGNOSIS — H52.4 HYPEROPIA WITH PRESBYOPIA OF BOTH EYES: Primary | ICD-10-CM

## 2025-06-09 PROBLEM — F19.10: Status: RESOLVED | Noted: 2025-03-05 | Resolved: 2025-06-09

## 2025-06-09 PROBLEM — K58.1 IRRITABLE BOWEL SYNDROME WITH CONSTIPATION: Status: ACTIVE | Noted: 2025-06-09

## 2025-06-09 PROBLEM — K58.0 IRRITABLE BOWEL SYNDROME WITH DIARRHEA: Status: RESOLVED | Noted: 2025-06-09 | Resolved: 2025-06-09

## 2025-06-09 PROBLEM — K21.9 GASTRO-ESOPHAGEAL REFLUX DISEASE WITHOUT ESOPHAGITIS: Status: ACTIVE | Noted: 2025-06-09

## 2025-06-09 PROCEDURE — 92004 COMPRE OPH EXAM NEW PT 1/>: CPT | Performed by: STUDENT IN AN ORGANIZED HEALTH CARE EDUCATION/TRAINING PROGRAM

## 2025-06-09 PROCEDURE — 92015 DETERMINE REFRACTIVE STATE: CPT | Performed by: STUDENT IN AN ORGANIZED HEALTH CARE EDUCATION/TRAINING PROGRAM

## 2025-06-09 RX ORDER — FLUTICASONE FUROATE 27.5 UG/1
1 SPRAY, METERED NASAL
Qty: 10 G | Refills: 0 | Status: SHIPPED | OUTPATIENT
Start: 2025-06-09 | End: 2026-06-09

## 2025-06-09 ASSESSMENT — EXTERNAL EXAM - LEFT EYE: OS_EXAM: NORMAL

## 2025-06-09 ASSESSMENT — ENCOUNTER SYMPTOMS
GASTROINTESTINAL NEGATIVE: 0
NEUROLOGICAL NEGATIVE: 0
ALLERGIC/IMMUNOLOGIC NEGATIVE: 0
HEMATOLOGIC/LYMPHATIC NEGATIVE: 0
EYES NEGATIVE: 0
ENDOCRINE NEGATIVE: 0
RESPIRATORY NEGATIVE: 0
MUSCULOSKELETAL NEGATIVE: 0
CARDIOVASCULAR NEGATIVE: 0
CONSTITUTIONAL NEGATIVE: 0
PSYCHIATRIC NEGATIVE: 0

## 2025-06-09 ASSESSMENT — REFRACTION_MANIFEST
METHOD_AUTOREFRACTION: 1
OD_AXIS: 016
OD_SPHERE: +1.50
OS_SPHERE: +1.00
OS_SPHERE: +1.00
OD_ADD: +2.25
OS_ADD: +2.25
OD_AXIS: 106
OS_CYLINDER: SPHERE
OD_SPHERE: +1.25
OD_CYLINDER: -0.50
OD_CYLINDER: +0.75

## 2025-06-09 ASSESSMENT — CONF VISUAL FIELD
OD_INFERIOR_TEMPORAL_RESTRICTION: 0
OD_SUPERIOR_NASAL_RESTRICTION: 0
OS_INFERIOR_NASAL_RESTRICTION: 0
OS_SUPERIOR_NASAL_RESTRICTION: 0
OS_NORMAL: 1
OD_NORMAL: 1
OS_INFERIOR_TEMPORAL_RESTRICTION: 0
OD_INFERIOR_NASAL_RESTRICTION: 0
OS_SUPERIOR_TEMPORAL_RESTRICTION: 0
METHOD: COUNTING FINGERS
OD_SUPERIOR_TEMPORAL_RESTRICTION: 0

## 2025-06-09 ASSESSMENT — VISUAL ACUITY
OD_SC: 20/40
OD_PH_SC: 20/20-2
METHOD: SNELLEN - LINEAR
OS_PH_SC: 20/20-2
OS_SC: 20/30

## 2025-06-09 ASSESSMENT — TONOMETRY
OD_IOP_MMHG: 10
OS_IOP_MMHG: 10
IOP_METHOD: GOLDMANN APPLANATION

## 2025-06-09 ASSESSMENT — EXTERNAL EXAM - RIGHT EYE: OD_EXAM: NORMAL

## 2025-06-09 ASSESSMENT — CUP TO DISC RATIO
OS_RATIO: .40
OD_RATIO: .40

## 2025-06-09 ASSESSMENT — SLIT LAMP EXAM - LIDS
COMMENTS: NORMAL
COMMENTS: NORMAL

## 2025-06-09 NOTE — PROGRESS NOTES
Assessment/Plan   Diagnoses and all orders for this visit:  Hyperopia with presbyopia of both eyes  Age-related nuclear cataract of both eyes  -New spec rx released today per patient request. Ocular health wnl for age OU. Monitor 1 year or sooner prn. Refraction billed today.  -cataracts are mild non visually significant; pt ed    RTC 1 year for annual with DFE and MRX

## 2025-06-10 ENCOUNTER — TELEMEDICINE (OUTPATIENT)
Dept: PRIMARY CARE | Facility: CLINIC | Age: 55
End: 2025-06-10
Payer: COMMERCIAL

## 2025-06-10 DIAGNOSIS — R91.8 LUNG MASS: ICD-10-CM

## 2025-06-10 DIAGNOSIS — R91.8 GROUND GLASS OPACITY PRESENT ON IMAGING OF LUNG: Primary | ICD-10-CM

## 2025-06-10 DIAGNOSIS — J98.11 ATELECTASIS: ICD-10-CM

## 2025-06-10 PROCEDURE — 4004F PT TOBACCO SCREEN RCVD TLK: CPT | Performed by: NURSE PRACTITIONER

## 2025-06-10 PROCEDURE — 99202 OFFICE O/P NEW SF 15 MIN: CPT | Performed by: NURSE PRACTITIONER

## 2025-06-10 SDOH — ECONOMIC STABILITY: FOOD INSECURITY: WITHIN THE PAST 12 MONTHS, THE FOOD YOU BOUGHT JUST DIDN'T LAST AND YOU DIDN'T HAVE MONEY TO GET MORE.: NEVER TRUE

## 2025-06-10 SDOH — ECONOMIC STABILITY: FOOD INSECURITY: WITHIN THE PAST 12 MONTHS, YOU WORRIED THAT YOUR FOOD WOULD RUN OUT BEFORE YOU GOT MONEY TO BUY MORE.: NEVER TRUE

## 2025-06-10 ASSESSMENT — LIFESTYLE VARIABLES
HOW MANY STANDARD DRINKS CONTAINING ALCOHOL DO YOU HAVE ON A TYPICAL DAY: 1 OR 2
SKIP TO QUESTIONS 9-10: 1
HOW OFTEN DO YOU HAVE SIX OR MORE DRINKS ON ONE OCCASION: NEVER
AUDIT-C TOTAL SCORE: 2
HOW OFTEN DO YOU HAVE A DRINK CONTAINING ALCOHOL: 2-4 TIMES A MONTH

## 2025-06-10 ASSESSMENT — ENCOUNTER SYMPTOMS
OCCASIONAL FEELINGS OF UNSTEADINESS: 0
DEPRESSION: 0
LOSS OF SENSATION IN FEET: 0

## 2025-06-10 ASSESSMENT — PAIN SCALES - GENERAL: PAINLEVEL_OUTOF10: 0-NO PAIN

## 2025-06-10 ASSESSMENT — COLUMBIA-SUICIDE SEVERITY RATING SCALE - C-SSRS
6. HAVE YOU EVER DONE ANYTHING, STARTED TO DO ANYTHING, OR PREPARED TO DO ANYTHING TO END YOUR LIFE?: NO
1. IN THE PAST MONTH, HAVE YOU WISHED YOU WERE DEAD OR WISHED YOU COULD GO TO SLEEP AND NOT WAKE UP?: NO
2. HAVE YOU ACTUALLY HAD ANY THOUGHTS OF KILLING YOURSELF?: NO

## 2025-06-10 NOTE — PROGRESS NOTES
Subjective   Patient ID: Marlys Toussaint is a 55 y.o. female who presents for New Patient Visit (Marlys Toussaint has a new visit regarding a lung nodule.  Current every day smoker of 30 years 1/2 packs per day. ).  HPI 55-year-old female presents today for lung nodule clinic.    Smokin.5 ppd x 30 years  Personal history of cancer: Breast cancer   Family history of lung cancer:    Telehealth visit between Marlys Toussaint and Emma Lewis CNP at [facility] lung nodule clinic per patient request.    2025 CT Cardiac score   No gross evidence of mediastinal or hilar lymphadenopathy or masses  is identified. The visualized segments of the lungs are normally  expanded. Secretions are noted in the lower trachea extending into  the right mainstem and bronchus intermedius. There are areas of  mucous plugging in the segmental and subsegmental right lower lobe airways. Subpleural reticulations in the anterior left upper lobe  likely represent sequelae of prior radiation therapy. Linear  parenchymal opacity in the right lower lobe appears stable from prior exam and likely represents scarring. Ill-defined areas of  ground-glass nodularity in the left upper lobe on series 201, image 3 and image 5.    Review of Systems  Review of systems: Not present-chills, fatigue and fever.  Respiratory: Not present-difficulty breathing, cough, bloody sputum.  Cardiovascular: Not present - chest pain, palpitations, dyspnea on exertion.    Objective   There were no vitals taken for this visit.   Assessment/Plan   Diagnoses and all orders for this visit:  Ground glass opacity present on imaging of lung  -     CT chest wo IV contrast; Future  Atelectasis  -     CT chest wo IV contrast; Future  Lung mass  -     Referral to Pulmonology

## 2025-06-10 NOTE — PATIENT INSTRUCTIONS
Areas of mucous plugging in the right lower lobe.  Reticulation in the anterior left upper lobe which may represent sequelae of radiation.  Ill-defined areas of groundglass nodularity in the left upper lobe.  Findings noted on limited study.  Recommend CT of the chest.  She will be notified of results as they become available.  Referral given to [place] [facility].        [facility]  Lung Nodule Clinic      [address]   [address] [place] [address]   Phone [phone]   Nurse Coordinator: [phone]   Fax [phone]        Pulmonary Referral Notification      NEW ORDER: Pulmonary Outpatient  [place] [facility]  [address]   [place]. [place] [address]  Phone: [phone]  Fax: [phone]    Please contact [facility] [phone]     Thank you, and we are here to support you with any additional care you may need.     Referral placed for [place] [facility].      [facility]  Lung Nodule Clinic      [address]   [address], [address] [place], [place] [address]   Phone [phone]   Nurse Coordinator: [phone]   Fax [phone]        Pulmonary Referral Notification      NEW ORDER: Pulmonary Outpatient  [facility]  [address]   [place]. [place] [address]  Phone: [phone]  Fax: [phone]    Please contact [facility] [phone]     Thank you, and we are here to support you with any additional care you may need.         Lung Nodule Clinic  [address]  [address], [address] [place], [place] [address]  Phone [phone]  Fax [phone]  Nurse Coordinator [phone]                                          Welcome to the [facility] Lung Nodule Clinic    Today was the initial consult with the lung nodule clinic to determine proper recommendations for follow up. Your care is coordinated to ensure timely management.  As you know, early detection of cancer is very important.  Nodules that are large, look  suspicious or have changed over time is why further evaluation such as the additional imaging test that we have ordered is needed. Our clinic will work closely with you in choosing the best next step.       What is my next step?  We will assist with scheduling scans, results reviews, and referrals for priority appointments.      Who do I call?  Your care coordinator for the lung nodule clinic can be contacted at [phone]  All scheduling needs can be assisted within the Cardiac Surgery/Thoracic Surgery/Lung Nodule Clinic offices at [phone].              Table  Lucretia H, Samra DP, Akhil NEFF, et al. Guidelines for Management of Incidental Pulmonary Nodules Detected on CT Images: From the Fleischner Society 2017. Radiology 2017;284:228-243.

## 2025-06-20 ENCOUNTER — HOSPITAL ENCOUNTER (OUTPATIENT)
Dept: RADIOLOGY | Facility: CLINIC | Age: 55
Discharge: HOME | End: 2025-06-20
Payer: COMMERCIAL

## 2025-06-20 DIAGNOSIS — J98.11 ATELECTASIS: ICD-10-CM

## 2025-06-20 DIAGNOSIS — R91.8 GROUND GLASS OPACITY PRESENT ON IMAGING OF LUNG: ICD-10-CM

## 2025-06-20 PROCEDURE — 71250 CT THORAX DX C-: CPT

## 2025-06-23 ENCOUNTER — TELEPHONE (OUTPATIENT)
Dept: PRIMARY CARE | Facility: CLINIC | Age: 55
End: 2025-06-23
Payer: COMMERCIAL

## 2025-06-30 ENCOUNTER — TELEPHONE (OUTPATIENT)
Dept: PRIMARY CARE | Facility: CLINIC | Age: 55
End: 2025-06-30
Payer: COMMERCIAL

## 2025-06-30 DIAGNOSIS — F17.210 CIGARETTE SMOKER: ICD-10-CM

## 2025-06-30 DIAGNOSIS — R91.8 GROUND GLASS OPACITY PRESENT ON IMAGING OF LUNG: ICD-10-CM

## 2025-06-30 DIAGNOSIS — J98.11 ATELECTASIS: ICD-10-CM

## 2025-06-30 DIAGNOSIS — R91.8 LUNG NODULES: Primary | ICD-10-CM

## 2025-06-30 NOTE — TELEPHONE ENCOUNTER
Sms sent - mailbox was full   A few scattered tiny nodules measuring up to 2 mm in the upper lobes. Recommend 12 month follow-up with CT to confirm long-term stability of these findings    Order placed.   She does have referral to pulmonary as needed.

## 2025-07-01 ENCOUNTER — OFFICE VISIT (OUTPATIENT)
Facility: CLINIC | Age: 55
End: 2025-07-01
Payer: COMMERCIAL

## 2025-07-01 VITALS
HEART RATE: 75 BPM | SYSTOLIC BLOOD PRESSURE: 126 MMHG | OXYGEN SATURATION: 98 % | HEIGHT: 64 IN | BODY MASS INDEX: 22.02 KG/M2 | WEIGHT: 129 LBS | DIASTOLIC BLOOD PRESSURE: 86 MMHG | RESPIRATION RATE: 18 BRPM

## 2025-07-01 DIAGNOSIS — R91.8 LUNG MASS: ICD-10-CM

## 2025-07-01 DIAGNOSIS — R91.1 LUNG NODULE: Primary | ICD-10-CM

## 2025-07-01 PROCEDURE — 99215 OFFICE O/P EST HI 40 MIN: CPT | Performed by: THORACIC SURGERY (CARDIOTHORACIC VASCULAR SURGERY)

## 2025-07-01 PROCEDURE — 3008F BODY MASS INDEX DOCD: CPT | Performed by: THORACIC SURGERY (CARDIOTHORACIC VASCULAR SURGERY)

## 2025-07-01 PROCEDURE — 99205 OFFICE O/P NEW HI 60 MIN: CPT | Performed by: THORACIC SURGERY (CARDIOTHORACIC VASCULAR SURGERY)

## 2025-07-01 PROCEDURE — 4004F PT TOBACCO SCREEN RCVD TLK: CPT | Performed by: THORACIC SURGERY (CARDIOTHORACIC VASCULAR SURGERY)

## 2025-07-01 ASSESSMENT — ENCOUNTER SYMPTOMS
EYES NEGATIVE: 1
ABDOMINAL DISTENTION: 0
CHILLS: 0
CONSTIPATION: 0
APPETITE CHANGE: 0
DIAPHORESIS: 0
SHORTNESS OF BREATH: 0
STRIDOR: 0
VOMITING: 0
PALPITATIONS: 0
FEVER: 0
CHEST TIGHTNESS: 0
COUGH: 0
ENDOCRINE NEGATIVE: 1
OCCASIONAL FEELINGS OF UNSTEADINESS: 0
ALLERGIC/IMMUNOLOGIC NEGATIVE: 1
DIARRHEA: 0
NEUROLOGICAL NEGATIVE: 1
NAUSEA: 0
UNEXPECTED WEIGHT CHANGE: 0
FATIGUE: 0
HEMATOLOGIC/LYMPHATIC NEGATIVE: 1
MUSCULOSKELETAL NEGATIVE: 1
CHOKING: 0
ABDOMINAL PAIN: 0
DEPRESSION: 0
LOSS OF SENSATION IN FEET: 0
PSYCHIATRIC NEGATIVE: 1
WHEEZING: 0

## 2025-07-01 ASSESSMENT — PATIENT HEALTH QUESTIONNAIRE - PHQ9
2. FEELING DOWN, DEPRESSED OR HOPELESS: NOT AT ALL
1. LITTLE INTEREST OR PLEASURE IN DOING THINGS: NOT AT ALL
SUM OF ALL RESPONSES TO PHQ9 QUESTIONS 1 AND 2: 0

## 2025-07-01 ASSESSMENT — LIFESTYLE VARIABLES
HOW OFTEN DO YOU HAVE SIX OR MORE DRINKS ON ONE OCCASION: NEVER
SKIP TO QUESTIONS 9-10: 1
HOW OFTEN DO YOU HAVE A DRINK CONTAINING ALCOHOL: 2-4 TIMES A MONTH
AUDIT-C TOTAL SCORE: 2
HOW MANY STANDARD DRINKS CONTAINING ALCOHOL DO YOU HAVE ON A TYPICAL DAY: 1 OR 2

## 2025-07-01 ASSESSMENT — PAIN SCALES - GENERAL: PAINLEVEL_OUTOF10: 0-NO PAIN

## 2025-07-01 NOTE — PROGRESS NOTES
Subjective   Patient ID: Marlys Toussaint is a 55 y.o. female who presents for Consult.  HPI    55-year-old female with a history of heavy smoking about 30 years who is smoking now about 3 cigarettes a day.  She also does marijuana on a daily basis.  She underwent a CT cardiac score and found to have some small nodules in the left upper lobe.  She underwent a dedicated CT of the chest that revealed very small 2 mm nodules that are not clinically significant.  Due to her history of smoking and these nodules I will see her again in 1 year with a CT of the chest.    Review of Systems   Constitutional:  Negative for appetite change, chills, diaphoresis, fatigue, fever and unexpected weight change.   HENT: Negative.     Eyes: Negative.    Respiratory:  Negative for cough, choking, chest tightness, shortness of breath, wheezing and stridor.    Cardiovascular:  Negative for chest pain, palpitations and leg swelling.   Gastrointestinal:  Negative for abdominal distention, abdominal pain, constipation, diarrhea, nausea and vomiting.   Endocrine: Negative.    Genitourinary: Negative.    Musculoskeletal: Negative.    Skin: Negative.    Allergic/Immunologic: Negative.    Neurological: Negative.    Hematological: Negative.    Psychiatric/Behavioral: Negative.     All other systems reviewed and are negative.      Objective   Physical Exam  Constitutional:       Appearance: Normal appearance.   HENT:      Head: Normocephalic and atraumatic.      Nose: Nose normal.      Mouth/Throat:      Mouth: Mucous membranes are moist.      Pharynx: Oropharynx is clear.   Eyes:      Extraocular Movements: Extraocular movements intact.      Conjunctiva/sclera: Conjunctivae normal.      Pupils: Pupils are equal, round, and reactive to light.   Cardiovascular:      Rate and Rhythm: Normal rate and regular rhythm.      Pulses: Normal pulses.      Heart sounds: Normal heart sounds.   Pulmonary:      Effort: Pulmonary effort is normal. No respiratory  distress.      Breath sounds: Normal breath sounds. No stridor. No wheezing, rhonchi or rales.   Chest:      Chest wall: No tenderness.   Abdominal:      General: Abdomen is flat. Bowel sounds are normal.      Palpations: Abdomen is soft.   Musculoskeletal:         General: Normal range of motion.      Cervical back: Normal range of motion and neck supple.   Skin:     General: Skin is warm and dry.      Capillary Refill: Capillary refill takes less than 2 seconds.   Neurological:      General: No focal deficit present.      Mental Status: She is alert and oriented to person, place, and time.         Assessment/Plan   Diagnoses and all orders for this visit:  Lung nodule  Lung mass  -     Referral to Thoracic Surgery  Follow-up in 1 year with a CT of the chest.         Noble Steele MD 07/01/25 9:14 AM

## 2025-07-02 DIAGNOSIS — R91.1 LUNG NODULE: ICD-10-CM

## 2025-07-08 ENCOUNTER — OFFICE VISIT (OUTPATIENT)
Dept: CARDIOLOGY | Facility: CLINIC | Age: 55
End: 2025-07-08
Payer: COMMERCIAL

## 2025-07-08 VITALS
SYSTOLIC BLOOD PRESSURE: 131 MMHG | DIASTOLIC BLOOD PRESSURE: 93 MMHG | WEIGHT: 126.9 LBS | HEART RATE: 71 BPM | HEIGHT: 64 IN | OXYGEN SATURATION: 98 % | BODY MASS INDEX: 21.66 KG/M2

## 2025-07-08 DIAGNOSIS — R93.1 ELEVATED CORONARY ARTERY CALCIUM SCORE: ICD-10-CM

## 2025-07-08 DIAGNOSIS — R07.89 OTHER CHEST PAIN: Primary | ICD-10-CM

## 2025-07-08 DIAGNOSIS — R03.0 ELEVATED BLOOD PRESSURE READING: ICD-10-CM

## 2025-07-08 LAB
ATRIAL RATE: 62 BPM
P AXIS: 10 DEGREES
P OFFSET: 199 MS
P ONSET: 153 MS
PR INTERVAL: 134 MS
Q ONSET: 220 MS
QRS COUNT: 10 BEATS
QRS DURATION: 94 MS
QT INTERVAL: 404 MS
QTC CALCULATION(BAZETT): 410 MS
QTC FREDERICIA: 408 MS
R AXIS: 63 DEGREES
T AXIS: 57 DEGREES
T OFFSET: 422 MS
VENTRICULAR RATE: 62 BPM

## 2025-07-08 PROCEDURE — 99204 OFFICE O/P NEW MOD 45 MIN: CPT | Performed by: HOSPITALIST

## 2025-07-08 PROCEDURE — 93005 ELECTROCARDIOGRAM TRACING: CPT | Performed by: HOSPITALIST

## 2025-07-08 PROCEDURE — 93010 ELECTROCARDIOGRAM REPORT: CPT | Performed by: INTERNAL MEDICINE

## 2025-07-08 PROCEDURE — 3008F BODY MASS INDEX DOCD: CPT | Performed by: HOSPITALIST

## 2025-07-08 PROCEDURE — 99212 OFFICE O/P EST SF 10 MIN: CPT

## 2025-07-08 PROCEDURE — 4004F PT TOBACCO SCREEN RCVD TLK: CPT | Performed by: HOSPITALIST

## 2025-07-08 RX ORDER — ROSUVASTATIN CALCIUM 20 MG/1
20 TABLET, COATED ORAL DAILY
Qty: 30 TABLET | Refills: 11 | Status: SHIPPED | OUTPATIENT
Start: 2025-07-08 | End: 2026-07-08

## 2025-07-08 ASSESSMENT — PATIENT HEALTH QUESTIONNAIRE - PHQ9
SUM OF ALL RESPONSES TO PHQ9 QUESTIONS 1 AND 2: 1
2. FEELING DOWN, DEPRESSED OR HOPELESS: SEVERAL DAYS
1. LITTLE INTEREST OR PLEASURE IN DOING THINGS: NOT AT ALL

## 2025-07-08 ASSESSMENT — COLUMBIA-SUICIDE SEVERITY RATING SCALE - C-SSRS
6. HAVE YOU EVER DONE ANYTHING, STARTED TO DO ANYTHING, OR PREPARED TO DO ANYTHING TO END YOUR LIFE?: NO
2. HAVE YOU ACTUALLY HAD ANY THOUGHTS OF KILLING YOURSELF?: NO
1. IN THE PAST MONTH, HAVE YOU WISHED YOU WERE DEAD OR WISHED YOU COULD GO TO SLEEP AND NOT WAKE UP?: NO

## 2025-07-08 ASSESSMENT — PAIN SCALES - GENERAL: PAINLEVEL_OUTOF10: 0-NO PAIN

## 2025-07-08 NOTE — PROGRESS NOTES
Subjective   Marlys Toussaint is a 55 y.o. female with PMH of rheumatoid arthritis, pulmonary nodules, IBS, GERD, bipolar, and recent DVT on Eliquis, who is here for evaluation of elevated coronary calcium score.  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  She complains of chest pain over the left side of her chest, every other day, nonradiating, no associated symptoms, last for a few minutes, happens mainly with stress and stressful activities.  She denies any HERNANDEZ.  No orthopnea, PND, dizziness or palpitation.    Patient is on Eliquis 5 mg twice daily, and no other cardiac medications.  Today's blood pressure is 131/93, heart rate 71.    Blood work from 3/20/2025 with creatinine of 0.72, HDL of 75, LDL of 145, triglyceride of 131, and hemoglobin of 15.5.    EKG from 7/8/2025, reviewed, showed NSR, early repol, otherwise normal EKG.    [facility] ultrasound with nonocclusive DVT in the midportion of the right peroneal vein.    CT coronary calcium score with score of 272.59 [6 in the left main, 216 in the LAD, 34 in the LCx, and 18 in the RCA].    Review of Systems  ROS is negative other than in HPI.      Objective   Physical Exam  General: NAD  HEENT: IEOM, PERRL   Neck: No JVD or carotid bruit  Lungs: CTAB  Heart: RRR, normal S1 and S2, no loud murmurs  Abdomen: Soft, nontender, positive bowel sounds  Extremities: No edema  Neurologic: No FND  Psychiatric: Normal mood and affect    Assessment/Plan   1-elevated coronary calcium score:  -CT coronary calcium score with score of 272.59 [6 in the left main, 216 in the LAD, 34 in the LCx, and 18 in the RCA].  -Blood work from 3/20/2025 with HDL of 75, LDL of 145, triglyceride of 131.  -Patient complains of chest pain, see HPI for details.  -Will proceed with exercise treadmill stress test to rule out obstructive CAD.  -Will start rosuvastatin 20 mg once daily.    2-elevated BP readings:  -No history of  hypertension.  Patient to check her numbers at home and share with PCP.     3-DVT management as per PCP/referral to vascular medicine.      Rachele Nieto MD

## 2025-07-08 NOTE — PATIENT INSTRUCTIONS
Thank you for visiting us today.    We are going to proceed with an exercise treadmill stress test.  Please call us if you do not hear from us within a few days of your testing.  Our office number is [phone]    Please start rosuvastatin 20 mg once daily.    Your blood pressure numbers are slightly elevated today, please check at home morning and evening, share numbers with your primary care physician in 1 week.

## 2025-07-10 DIAGNOSIS — T78.40XA ALLERGY, INITIAL ENCOUNTER: ICD-10-CM

## 2025-07-10 RX ORDER — FLUTICASONE FUROATE 27.5 UG/1
1 SPRAY, METERED NASAL
Qty: 10 G | Refills: 5 | Status: SHIPPED | OUTPATIENT
Start: 2025-07-10 | End: 2026-07-10

## 2025-07-21 ENCOUNTER — APPOINTMENT (OUTPATIENT)
Dept: PRIMARY CARE | Facility: CLINIC | Age: 55
End: 2025-07-21
Payer: COMMERCIAL

## 2025-07-21 VITALS
WEIGHT: 132.4 LBS | DIASTOLIC BLOOD PRESSURE: 82 MMHG | BODY MASS INDEX: 22.61 KG/M2 | SYSTOLIC BLOOD PRESSURE: 136 MMHG | HEIGHT: 64 IN

## 2025-07-21 DIAGNOSIS — M81.0 AGE RELATED OSTEOPOROSIS, UNSPECIFIED PATHOLOGICAL FRACTURE PRESENCE: ICD-10-CM

## 2025-07-21 DIAGNOSIS — Z79.01 ANTICOAGULATION ADEQUATE: ICD-10-CM

## 2025-07-21 DIAGNOSIS — M19.90 ARTHRITIS: ICD-10-CM

## 2025-07-21 DIAGNOSIS — I82.4Y1 DEEP VEIN THROMBOSIS (DVT) OF PROXIMAL VEIN OF RIGHT LOWER EXTREMITY, UNSPECIFIED CHRONICITY: ICD-10-CM

## 2025-07-21 DIAGNOSIS — M81.0 OSTEOPOROSIS, UNSPECIFIED OSTEOPOROSIS TYPE, UNSPECIFIED PATHOLOGICAL FRACTURE PRESENCE: Primary | ICD-10-CM

## 2025-07-21 DIAGNOSIS — G89.29 OTHER CHRONIC PAIN: ICD-10-CM

## 2025-07-21 DIAGNOSIS — M19.90 OSTEOARTHRITIS, UNSPECIFIED OSTEOARTHRITIS TYPE, UNSPECIFIED SITE: ICD-10-CM

## 2025-07-21 DIAGNOSIS — E78.9 ABNORMAL SERUM CHOLESTEROL: ICD-10-CM

## 2025-07-21 DIAGNOSIS — T78.40XA ALLERGY, INITIAL ENCOUNTER: ICD-10-CM

## 2025-07-21 DIAGNOSIS — K21.9 GASTROESOPHAGEAL REFLUX DISEASE WITHOUT ESOPHAGITIS: ICD-10-CM

## 2025-07-21 PROCEDURE — G2211 COMPLEX E/M VISIT ADD ON: HCPCS | Performed by: INTERNAL MEDICINE

## 2025-07-21 PROCEDURE — 3008F BODY MASS INDEX DOCD: CPT | Performed by: INTERNAL MEDICINE

## 2025-07-21 PROCEDURE — 99214 OFFICE O/P EST MOD 30 MIN: CPT | Performed by: INTERNAL MEDICINE

## 2025-07-21 RX ORDER — FLUTICASONE FUROATE 27.5 UG/1
1 SPRAY, METERED NASAL
Qty: 10 G | Refills: 5 | Status: SHIPPED | OUTPATIENT
Start: 2025-07-21 | End: 2025-07-21 | Stop reason: SDUPTHER

## 2025-07-21 RX ORDER — FLUTICASONE FUROATE 27.5 UG/1
1 SPRAY, METERED NASAL
Qty: 10 G | Refills: 5 | Status: SHIPPED | OUTPATIENT
Start: 2025-07-21 | End: 2025-07-23

## 2025-07-21 RX ORDER — PSYLLIUM HUSK 0.4 G
750 CAPSULE ORAL
Qty: 30 TABLET | Refills: 3 | Status: SHIPPED | OUTPATIENT
Start: 2025-07-21 | End: 2026-07-21

## 2025-07-21 RX ORDER — ACETAMINOPHEN 500 MG
125 TABLET ORAL DAILY
Qty: 30 TABLET | Refills: 3 | Status: SHIPPED | OUTPATIENT
Start: 2025-07-21

## 2025-07-21 NOTE — PROGRESS NOTES
"Subjective   Patient ID: Marlys Toussaint is a 55 y.o. female who presents for Follow-up (Multiple medical issues).    Ms. Marlys Toussaint today came here for multiple medical issues.  1.  She got DVT.  She is on Eliquis.  She cannot take any anti-inflammatory.  She is aware.  She has severe back pain.  Bone and joint arthritic pain.  Not feeling good.  2.  Allergies are bothering her.  3. Follow-up on other conditions.    I have personally reviewed the patient's Past Medical History, Medications, Allergies, Social History, and Family History in the EMR.    Review of Systems   All other systems reviewed and are negative.    Objective   /82   Ht 1.626 m (5' 4\")   Wt 60.1 kg (132 lb 6.4 oz)   BMI 22.73 kg/m²     Physical Exam  Vitals reviewed.     Cardiovascular:      Heart sounds: Normal heart sounds, S1 normal and S2 normal. No murmur heard.     No friction rub.   Pulmonary:      Effort: Pulmonary effort is normal.      Breath sounds: Normal breath sounds and air entry.   Abdominal:      Palpations: There is no hepatomegaly, splenomegaly or mass.     Musculoskeletal:      Right lower leg: No edema.      Left lower leg: No edema.      Comments: Bone and joint arthritic pain.   Lymphadenopathy:      Lower Body: No right inguinal adenopathy. No left inguinal adenopathy.     Neurological:      Cranial Nerves: Cranial nerves 2-12 are intact.      Sensory: No sensory deficit.      Motor: Motor function is intact.      Deep Tendon Reflexes: Reflexes are normal and symmetric.     LAB WORK:  Laboratory testing discussed.    Assessment/Plan   Problem List Items Addressed This Visit           ICD-10-CM    Age related osteoporosis M81.0    Relevant Medications    denosumab (Prolia) 60 mg/mL syringe    calcium carbonate (Calcium 600) 600 mg calcium (1,500 mg) tablet    cholecalciferol (Vitamin D3) 125 mcg (5,000 units) tablet    Other Relevant Orders    Referral to Rheumatology     Other Visit Diagnoses         Codes      " Osteoporosis, unspecified osteoporosis type, unspecified pathological fracture presence    -  Primary M81.0    Relevant Medications    denosumab (Prolia) 60 mg/mL syringe    calcium carbonate (Calcium 600) 600 mg calcium (1,500 mg) tablet    cholecalciferol (Vitamin D3) 125 mcg (5,000 units) tablet      Allergy, initial encounter     T78.40XA    Relevant Medications    fluticasone (Flonase Sensimist) 27.5 mcg/actuation nasal spray      Arthritis     M19.90    Relevant Orders    MAYNOR + LENI Panel    Creatine Kinase    Rheumatoid Factor    Sedimentation Rate    Uric Acid    Referral to Pain Medicine    Referral to Rheumatology      Deep vein thrombosis (DVT) of proximal vein of right lower extremity, unspecified chronicity     I82.4Y1    Relevant Orders    Referral To Hematology and Oncology      Abnormal serum cholesterol     E78.9    Relevant Medications    cholecalciferol (Vitamin D3) 125 mcg (5,000 units) tablet    Other Relevant Orders    Referral to Nutrition Services      Other chronic pain     G89.29    Relevant Orders    Referral to Pain Medicine      Osteoarthritis, unspecified osteoarthritis type, unspecified site     M19.90      Anticoagulation adequate     Z79.01      Gastroesophageal reflux disease without esophagitis     K21.9    Relevant Medications    calcium carbonate (Calcium 600) 600 mg calcium (1,500 mg) tablet    fluticasone (Flonase Sensimist) 27.5 mcg/actuation nasal spray        1. Severe osteoporosis.  She cannot take alendronate because of stomach upset.  She has a history of fractures.  Immediate Prolia, calcium and vitamin D explained.  2. Osteoarthritis and pain, bone and joint arthritis.  Referred to Pain Management.  3. Allergies.  Flonase given.  4. Bone and joint arthritic pain.  Refer her to the Rheumatology for evaluation.  5. Complete blood work ordered.  6. Anticoagulation.  She is on Eliquis.  7. GERD, on PPI.  8. Follow-up appointment with me in couple of weeks after  test.    Yadi Attestation  By signing my name below, I, Yadi Jones attest that this documentation has been prepared under the direction and in the presence of Tu Lang MD.     All medical record entries made by the yadi were personally dictated by me I have reviewed the chart and agree the record accurately reflects my personal performance of his history physical examination and management

## 2025-07-23 RX ORDER — FLUTICASONE FUROATE 27.5 UG/1
1 SPRAY, METERED NASAL
Qty: 10 G | Refills: 1 | Status: SHIPPED
Start: 2025-07-23

## 2025-07-24 ENCOUNTER — OFFICE VISIT (OUTPATIENT)
Dept: ORTHOPEDIC SURGERY | Facility: CLINIC | Age: 55
End: 2025-07-24
Payer: COMMERCIAL

## 2025-07-24 DIAGNOSIS — M19.049 CMC ARTHRITIS: Primary | ICD-10-CM

## 2025-07-24 PROCEDURE — 99212 OFFICE O/P EST SF 10 MIN: CPT | Performed by: ORTHOPAEDIC SURGERY

## 2025-07-24 ASSESSMENT — PAIN - FUNCTIONAL ASSESSMENT: PAIN_FUNCTIONAL_ASSESSMENT: 0-10

## 2025-07-24 ASSESSMENT — PAIN SCALES - GENERAL: PAINLEVEL_OUTOF10: 9

## 2025-07-25 DIAGNOSIS — M81.0 AGE RELATED OSTEOPOROSIS, UNSPECIFIED PATHOLOGICAL FRACTURE PRESENCE: Primary | ICD-10-CM

## 2025-07-25 DIAGNOSIS — M81.0 AGE RELATED OSTEOPOROSIS, UNSPECIFIED PATHOLOGICAL FRACTURE PRESENCE: ICD-10-CM

## 2025-07-25 PROCEDURE — 20600 DRAIN/INJ JOINT/BURSA W/O US: CPT | Mod: LT | Performed by: ORTHOPAEDIC SURGERY

## 2025-07-25 PROCEDURE — 2500000004 HC RX 250 GENERAL PHARMACY W/ HCPCS (ALT 636 FOR OP/ED): Performed by: ORTHOPAEDIC SURGERY

## 2025-07-25 RX ORDER — DIPHENHYDRAMINE HYDROCHLORIDE 50 MG/ML
50 INJECTION, SOLUTION INTRAMUSCULAR; INTRAVENOUS AS NEEDED
OUTPATIENT
Start: 2025-07-25

## 2025-07-25 RX ORDER — EPINEPHRINE 0.3 MG/.3ML
0.3 INJECTION SUBCUTANEOUS EVERY 5 MIN PRN
OUTPATIENT
Start: 2025-07-25

## 2025-07-25 RX ORDER — FAMOTIDINE 10 MG/ML
20 INJECTION, SOLUTION INTRAVENOUS ONCE AS NEEDED
OUTPATIENT
Start: 2025-07-25

## 2025-07-25 RX ORDER — ZOLEDRONIC ACID 5 MG/100ML
5 INJECTION, SOLUTION INTRAVENOUS ONCE
OUTPATIENT
Start: 2025-07-25

## 2025-07-25 RX ORDER — ALBUTEROL SULFATE 0.83 MG/ML
3 SOLUTION RESPIRATORY (INHALATION) AS NEEDED
OUTPATIENT
Start: 2025-07-25

## 2025-07-25 RX ORDER — LIDOCAINE HYDROCHLORIDE 10 MG/ML
1 INJECTION, SOLUTION INFILTRATION; PERINEURAL
Status: COMPLETED | OUTPATIENT
Start: 2025-07-25 | End: 2025-07-25

## 2025-07-25 RX ORDER — TRIAMCINOLONE ACETONIDE 40 MG/ML
40 INJECTION, SUSPENSION INTRA-ARTICULAR; INTRAMUSCULAR
Status: COMPLETED | OUTPATIENT
Start: 2025-07-25 | End: 2025-07-25

## 2025-07-25 RX ADMIN — BETAMETHASONE DIPROPIONATE 40 MG: 0.5 OINTMENT TOPICAL at 17:19

## 2025-07-25 RX ADMIN — LIDOCAINE HYDROCHLORIDE 1 ML: 10 INJECTION, SOLUTION INFILTRATION; PERINEURAL at 17:19

## 2025-07-25 NOTE — PROGRESS NOTES
History of Present Illness:  Chief Complaint   Patient presents with    Left Hand - Follow-up     Left thumb CMC arthritis      Patient returns for repeat evaluation of left thumb basilar joint arthritis.  She had an injection about 5 months ago that gave her good relief until more recently.  Over the last few weeks she has had significant increase in pain that is sometimes sharp at the base of her thumb.  Worse with gripping and pinching.  No numbness or tingling.    Past Medical History:   Diagnosis Date    Acute bronchitis, unspecified 12/03/2018    Acute bronchitis due to infection    Acute upper respiratory infection, unspecified 09/11/2017    Upper respiratory infection, acute    ADHD (attention deficit hyperactivity disorder) 1992    Anxiety 2012    Arthritis 2016    Breast cancer 2013    left    Calculus of gallbladder without cholecystitis without obstruction 01/03/2018    Calculus of gallbladder without cholecystitis without obstruction    Cervicalgia 05/27/2016    Neck pain    Chronic obstructive pulmonary disease, unspecified 12/11/2018    Chronic obstructive pulmonary disease    Chronic rhinitis 06/04/2013    Rhinitis    Chronic sinusitis, unspecified     Sinusitis    Colon perforation (Multi)     Deep vein thrombosis (Multi) 07/01/2025    Depression 2012    Diarrhea, unspecified 08/25/2016    Acute diarrhea    GERD (gastroesophageal reflux disease)     Headache, unspecified 02/26/2013    Headache    Herpes 1990    Inflammatory bowel disease 2011    Irritable bowel syndrome with diarrhea 06/09/2025    Kidney stone     Lung nodule     Nondependent mixed drug abuse 03/05/2025    Other conditions influencing health status 06/04/2013    Liver, Stomach, Or Bowel Disease    Other conditions influencing health status     History Of ___ Previous Pregnancies    Other conditions influencing health status     History Of ___ Previous Pregnancies    Other conditions influencing health status     Nephrolithiasis     Other intervertebral disc displacement, lumbar region     Lumbar herniated disc    Perforation of intestine (nontraumatic)     Colon perforation    Periapical abscess without sinus 01/07/2019    Dental infection    Personal history of diseases of the skin and subcutaneous tissue     History of hyperkeratosis of skin    Personal history of other diseases of the digestive system 12/12/2013    History of constipation    Personal history of other diseases of the digestive system 06/04/2013    History of ulcerative colitis    Personal history of other diseases of the musculoskeletal system and connective tissue     History of low back pain    Personal history of other diseases of the musculoskeletal system and connective tissue 05/04/2013    History of osteoarthritis    Personal history of other diseases of the respiratory system 07/13/2017    History of acute pharyngitis    Personal history of other diseases of the respiratory system 12/03/2018    History of acute bronchitis with bronchospasm    Personal history of other diseases of the respiratory system 02/06/2016    History of acute bronchitis    Personal history of other infectious and parasitic diseases 08/26/2014    History of herpes simplex infection    Personal history of other mental and behavioral disorders     History of depression    Personal history of other mental and behavioral disorders 05/04/2013    History of obsessive compulsive disorder    Personal history of other specified conditions     History of fatigue    Personal history of other specified conditions 06/04/2013    History of abnormal weight loss    Postmenopausal     Rheumatoid arthritis, unspecified     Rheumatoid arthritis    Urinary tract infection, site not specified 11/28/2017    Acute UTI       Medication Documentation Review Audit       Reviewed by Sharon Palacios CMA (Medical Assistant) on 07/24/25 at 0858      Medication Order Taking? Sig Documenting Provider Last Dose Status    albuterol 90 mcg/actuation inhaler [id number]  INHALE 2 PUFFS EVERY 4 HOURS IF NEEDED FOR WHEEZING OR SHORTNESS OF BREATH. Tu Lang MD  Active   apixaban (Eliquis) 5 mg tablet [id number]  Take 1 tablet (5 mg) by mouth twice a day. Do not fill before 2025. Emanuel Huang MD  Active   calcium carbonate (Calcium 600) 600 mg calcium (1,500 mg) tablet [id number]  Take 0.5 tablets (750 mg) by mouth 2 times daily (morning and late afternoon). Tu Lang MD  Active   cholecalciferol (Vitamin D3) 125 mcg (5,000 units) tablet [id number]  Take 1 tablet (125 mcg) by mouth once daily. Tu Lang MD  Active   cyclobenzaprine (Flexeril) 10 mg tablet [id number]  TAKE 1 TABLET (10 MG) BY MOUTH AS NEEDED AT BEDTIME FOR MUSCLE SPASMS. Tu Lang MD  Active   denosumab (Prolia) 60 mg/mL syringe [id number]  Inject 1 mL (60 mg total) under the skin every 6 months. Tu Lang MD  Active     Discontinued 25 1305     Discontinued 25 1327     Discontinued 25 2219   fluticasone (Flonase Sensimist) 27.5 mcg/actuation nasal spray [id number]  Administer 1 spray into each nostril once daily. Tu Lang MD  Active    Patient not taking:   Discontinued 25 2217   nabumetone (Relafen) 750 mg tablet [id number]  Take 1 tablet (750 mg) by mouth 2 times a day. Tu Lang MD  Active   omeprazole (PriLOSEC) 40 mg DR capsule [id number]  TAKE 1 CAPSULE (40 MG) BY MOUTH ONCE DAILY IN THE MORNING. TAKE BEFORE MEALS. DO NOT CRUSH OR CHEW. Tu Lang MD   25 2359   pantoprazole (ProtoNix) 40 mg EC tablet [id number]  Take 1 tablet (40 mg) by mouth once daily in the morning. Take before meals. Tu Lang MD  Active   rosuvastatin (Crestor) 20 mg tablet [id number]  Take 1 tablet (20 mg) by mouth once daily. Rachele Nieto MD  Active   traZODone (Desyrel) 50 mg tablet [id number]  TAKE 1 TABLET (50 MG) BY MOUTH AS NEEDED AT BEDTIME FOR SLEEP. Tu Lang MD  Active                     Allergies   Allergen Reactions    House Dust Mite Unknown    Pollen Extracts Unknown    Hydrocodone-Acetaminophen Hives, GI Upset, Rash and Unknown       Social History     Socioeconomic History    Marital status: Single     Spouse name: Not on file    Number of children: 3    Years of education: Not on file    Highest education level: Not on file   Occupational History    Occupation: Retired     Comment: She is on disability   Tobacco Use    Smoking status: Former     Current packs/day: 0.00     Average packs/day: 0.5 packs/day for 29.9 years (15.0 ttl pk-yrs)     Types: Cigarettes     Start date: 1995     Quit date:      Years since quittin.5    Smokeless tobacco: Never   Substance and Sexual Activity    Alcohol use: Never     Alcohol/week: 2.0 standard drinks of alcohol     Types: 1 Glasses of wine, 1 Standard drinks or equivalent per week     Comment: occasional    Drug use: Yes     Frequency: 4.0 times per week     Types: Marijuana     Comment: occasional    Sexual activity: Not Currently     Partners: Male     Birth control/protection: None   Other Topics Concern    Not on file   Social History Narrative    Not on file     Social Drivers of Health     Financial Resource Strain: Not at Risk (2025)    Received from [facility]    Financial Resource Strain     How hard is it for you to pay for the very basics like food, housing, heating, medical care, and medications?: 1   Food Insecurity: No Food Insecurity (6/10/2025)    Hunger Vital Sign     Worried About Running Out of Food in the Last Year: Never true     Ran Out of Food in the Last Year: Never true   Recent Concern: Food Insecurity - High Risk (3/22/2025)    Received from [facility] SDOH Screening     In the past 2 months, did you or others you live with eat smaller meals or skip meals because you didnt have money for food?: Yes   Transportation Needs: Not at Risk (2025)    Received from [facility]     Transportation Needs     In the past 12 months, has lack of transportation kept you from medical appointments, meetings, work or from getting things needed for daily living?: 1   Physical Activity: Not on File (2021)    Received from [facility]    Physical Activity     Physical Activity: 0   Stress: At Risk (2025)    Received from [facility]    Stress     Do you feel these kinds of stress these days?: 2   Social Connections: Not at Risk (2025)    Received from [facility]    Social Connections     How often do you see or talk to people that you care about and feel close to? (For example: talking to friends on phone, visiting friends or family, going to [employer] or club meetings): 1   Intimate Partner Violence: Not on file   Housing Stability: Not at Risk (2025)    Received from [facility]    Housing Stability     What is your living situation today? : 1       Past Surgical History:   Procedure Laterality Date    BREAST BIOPSY      BREAST LUMPECTOMY  2013    Breast Surgery Lumpectomy     SECTION, CLASSIC  10/07/2013     Section     SECTION, LOW TRANSVERSE      CHOLECYSTECTOMY  2018    Cholecystectomy    COLON SURGERY  2013    Colon Surgery    COLPOSCOPY  2012    EXPLORATORY LAPAROTOMY  10/07/2013    Exploratory Laparotomy    HERNIA REPAIR  2015    HYSTEROSCOPY  2013    Hysteroscopy With Endometrial Ablation    LYMPH NODE BIOPSY      OTHER SURGICAL HISTORY  2013    Tubal Ligation During  Section    OTHER SURGICAL HISTORY  2013    Axillary Lymphadenectomy    OTHER SURGICAL HISTORY  10/07/2013    Biopsy Endometrial, Without Cervical Dilation    OTHER SURGICAL HISTORY  2013    Gastrointestinal Surgery    SMALL INTESTINE SURGERY  10/07/2013    Small Bowel Resection    TONSILLECTOMY  2013    Tonsillectomy With Adenoidectomy    TUBAL LIGATION  2011    VARICOSE VEIN SURGERY  2013    Varicose Vein Ligation          Review of Systems    GENERAL: Negative for malaise, significant weight loss, fever  MUSCULOSKELETAL: see HPI  NEURO:  see HPI     Physical Examination:  Constitutional: Appears well-developed and well-nourished.  Musculoskeletal:  left wrist/hand:  No obvious swelling or masses  No thenar atrophy  No atrophy noted of hypothenar eminence   Negative Shanel's/Phalens  Sensation grossly intact to all digits  5/5 thumb Abduction/Finger Abduction  Tenderness about thumb cmc joint with positive CMC grind.  No tenderness about first dorsal compartment/thumb A1 pulley region  Radial pulse palpable  Good capillary refill     Assessment:  Patient with left thumb basilar joint arthritis.  Previous good response to corticosteroid injection     Plan: We once again reviewed diagnosis as well as risk and benefits of various treatment options.  She would like to attempt repeat corticosteroid injection at this time.  She will follow-up if persistent/recurrent symptoms.  Patient ID: Marlys Toussaint is a 55 y.o. female.    S Inj/Asp: L thumb CMC on 7/25/2025 5:19 PM  Indications: pain  Details: 25 G needle (dorsoradial) approach  Medications: 40 mg triamcinolone acetonide 40 mg/mL; 1 mL lidocaine 10 mg/mL (1 %)  Outcome: tolerated well, no immediate complications    Prepped with alcohol. Patient tolerated injection well. Band-aid applied to injection site.     Procedure, treatment alternatives, risks and benefits explained, specific risks discussed. Consent was given by the patient. Immediately prior to procedure a time out was called to verify the correct patient, procedure, equipment, support staff and site/side marked as required.

## 2025-07-28 ENCOUNTER — DOCUMENTATION (OUTPATIENT)
Dept: INFUSION THERAPY | Facility: CLINIC | Age: 55
End: 2025-07-28
Payer: COMMERCIAL

## 2025-07-28 NOTE — PROGRESS NOTES
CLINICAL CLEARANCE FOR OUTPATIENT INFUSION      Patient to be scheduled for  New Start of Reclast infusions    For Diagnosis: Osteoporosis     Labs required prior to treatment (ACTIVE ORDER IN CHART):  Lab Results   Component Value Date    CREATININE 0.72 03/03/2025      CrCl: 83.427 AT THAT TIME BUT WILL NEED UPDATED LABS PRIOR TO INFUSION    (hold / contact prescribing provider if <35ml/min)     Serum Calcium: 9.9 CORRECTED  9.18 AT THAT TIME BUT WILL NEED UPDATED LABS PRIOR TO INFUSION      (Serum or Corrected Calcium must be >8.6mg/dl. OR Ionized Calcium must be >1.1 mmol/L or >4.7 mg/dL (depending on resulting agency).  If below WNL - Contact prescribing provider. Labs should be drawn within 28 days of first treatment.)    Lab Results   Component Value Date    CALCIUM 9.9 03/03/2025      Lab Results   Component Value Date    ALBUMIN 4.9 03/03/2025        Calcium and Vitamin D supplement noted on medication list? Yes    Current Outpatient Medications   Medication Instructions    albuterol 90 mcg/actuation inhaler 2 puffs, inhalation, Every 4 hours PRN    apixaban (ELIQUIS) 5 mg, 2 times daily    calcium carbonate (CALCIUM 600) 750 mg, oral, 2 times daily (morning and late afternoon)    cholecalciferol (VITAMIN D3) 125 mcg, oral, Daily    cyclobenzaprine (FLEXERIL) 10 mg, oral, Nightly PRN    denosumab (PROLIA) 60 mg, subcutaneous, Every 6 months    fluticasone (Flonase Sensimist) 27.5 mcg/actuation nasal spray 1 spray, Each Nostril, Daily RT    nabumetone (RELAFEN) 750 mg, oral, 2 times daily    omeprazole (PRILOSEC) 40 mg, oral, Daily before breakfast, Do not crush or chew.    pantoprazole (PROTONIX) 40 mg, oral, Daily before breakfast    rosuvastatin (CRESTOR) 20 mg, oral, Daily    traZODone (DESYREL) 50 mg, oral, Nightly PRN        Is the patient receiving or have an allergy to Zometa? No  RX Allergies[1]     No obvious recent dental work per chart review. Nurse to confirm no dental within past/next 4 weeks  at encounter.    Urine Hcg test ordered prior to first infusion? Yes     Last infusion received: NA (if continuation)    Due: ANYTIME - PENDING LABS     Labs drawn no more than 28 days prior to their scheduled appointment CMP    Okay to schedule for treatment as ordered by prescribing provider pending labs. Please inform patient of need to have labs drawn prior to scheduled treatment. Active order for labs in chart.     PA COMPLETE    Ni Fishcer, APRN-CNP    [facility]        [1]   Allergies  Allergen Reactions    House Dust Mite Unknown    Pollen Extracts Unknown    Hydrocodone-Acetaminophen Hives, GI Upset, Rash and Unknown

## 2025-07-28 NOTE — PROGRESS NOTES
"Nutrition Initial Assessment:     Patient Marlys Toussaint is a 55 y.o. female being seen at [facility] who was referred by Tu Lang MD  on 7/21/25 for   1. Abnormal serum cholesterol          Nutrition Assessment    Problem List[1]    5 mins      Nutrition History:  Food & Nutrition History:    Food Allergies:  ;  Food Intolerances:    Vitamin/mineral intake:      Herbal supplements:    Medication and Complementary/Alternative Medicine Use:    Sleep Habits:    {SRNOTEHx (Optional):97132}    Diet Recall:                    Food Variety:    Oral Nutrition Supplement Use:           {ONS choices (Optional):25960}  Fluid Intake:    Energy Intake:    {Nutrition Specialties Section. Does the pt need info charted for TF, TPN, OB, Mindful Eating, DM, Disordered Eating)? If Yes, click this smart list. If not, skip & will appear as blank space in note. (Optional):68011}    Food Preparation:  Cooking:    Grocery Shopping:    Dining Out:      Physical Activity:        Anthropometrics:                          Ht Readings from Last 1 Encounters:   07/21/25 1.626 m (5' 4\")     Weight History:   Daily Weight  07/21/25 : 60.1 kg (132 lb 6.4 oz)  07/08/25 : 57.6 kg (126 lb 14.4 oz)  07/01/25 : 58.5 kg (129 lb)  06/04/25 : 57.6 kg (127 lb)  04/17/25 : 58.5 kg (129 lb)  03/17/25 : 59 kg (130 lb)  03/13/25 : 58.5 kg (129 lb)  03/03/25 : 58.5 kg (129 lb)  02/17/25 : 59.4 kg (131 lb)  04/01/24 : 58.7 kg (129 lb 6.6 oz)    Weight Change %:       Nutrition Focused Physical Exam Findings:  Subcutaneous Fat Loss:        Muscle Wasting:       Physical Findings:  Hair:    Eyes:    Nails:    Skin:    Respiratory:    Edema:        Nutrition Significant Labs:  CMP Trend:    Recent Labs     03/03/25  1346 05/26/22  1339 03/10/20  0948 09/23/19  1530 09/21/19  1834   GLUCOSE 73 108* 82   < > 103*    138 138   < > 139   K 4.7 4.4 4.6   < > 3.9    102 101   < > 106   CO2 27 25 25   < > 23*   ANIONGAP 9 11 12   < > 10   BUN " "14 8 15   < > 9   CREATININE 0.72 0.8 0.8   < > 0.6   EGFR 99 89 81  --  113   CALCIUM 9.9 9.5 9.8   < > 8.1*   ALBUMIN 4.9  --  4.5  --  3.7   ALKPHOS 81  --  76  --  75   PROT 7.8  --  7.6  --  6.2   AST 15  --  15  --  11   BILITOT 0.4  --  0.3  --  0.2   ALT 15  --  12  --  9    < > = values in this interval not displayed.   , CBC Trend:   Recent Labs     03/03/25  1346 05/26/22  1339 03/10/20  0948 09/23/19  1530 09/21/19  1834 04/05/19  1045   WBC 7.9 7.8 6.0 7.6 8.5 5.4   NRBC  --  0 0  --  0 0.0   RBC 5.28* 4.76 5.12* 4.29 4.29 5.72*   HGB 15.5 14.4 15.5* 13.3 13.3 17.1*   HCT 47.8* 42.8 48.2* 40.3 39.5 51.9*   MCV 90.5 89.9 94.1 94 92.1 91   MCH 29.4 30.3 30.3  --  31.0  --    MCHC 32.4 33.6 32.2 33.0 33.7 32.9   RDW 12.7  --   --  13.2  --  12.1    303 287 241 221 257   , DM Specific Labs Trend (Includes HgbA1C, antibodies & fasting insulin): No results for input(s): \"HGBA1C\", \"CPEPTIDE\", \"INSULFAST\" in the last 44544 hours.    No lab exists for component: \"BHDRXBUT\", \"GEN3BCM\", Lipid Panel Trend:    Recent Labs     03/03/25  1346 03/10/20  0948   CHOL 246* 206*   HDL 75 56   LDLCALC 145* 131*   TRIG 131 93   , and Vitamin D:   Lab Results   Component Value Date    VITD25 47 11/30/2018        Medications:  Current Outpatient Medications   Medication Instructions    albuterol 90 mcg/actuation inhaler 2 puffs, inhalation, Every 4 hours PRN    apixaban (ELIQUIS) 5 mg, 2 times daily    calcium carbonate (CALCIUM 600) 750 mg, oral, 2 times daily (morning and late afternoon)    cholecalciferol (VITAMIN D3) 125 mcg, oral, Daily    cyclobenzaprine (FLEXERIL) 10 mg, oral, Nightly PRN    denosumab (PROLIA) 60 mg, subcutaneous, Every 6 months    fluticasone (Flonase Sensimist) 27.5 mcg/actuation nasal spray 1 spray, Each Nostril, Daily RT    nabumetone (RELAFEN) 750 mg, oral, 2 times daily    omeprazole (PRILOSEC) 40 mg, oral, Daily before breakfast, Do not crush or chew.    pantoprazole (PROTONIX) 40 mg, " oral, Daily before breakfast    rosuvastatin (CRESTOR) 20 mg, oral, Daily    traZODone (DESYREL) 50 mg, oral, Nightly PRN        Estimated Needs:   ;     ;     ;     ;                    Nutrition Diagnosis                Nutrition Interventions/Recommendations   Nutrition Prescription:        Nutrition Interventions:   Food and Nutrient Delivery:       Coordination of Care:       Nutrition Education:   Nutrition Education Content:         Educational Handouts Provided: {SR Handouts List:67196}     Nutrition Counseling:   Theoretical Basis/Approach and/or Counseling Strategies:        Patient Goals:       Readiness to Change: {OPNOTE RDN assessment of patient:90861}   Level of Understanding: {OPNOTE RDN assessment of patient:33759}  Anticipated Compliance: {OPNOTE RDN assessment of patient:19943}       Nutrition Monitoring and Evaluation                                    Follow Up: {OPNOTEFOLLOWUP (Optional):72585}            [1]   Patient Active Problem List  Diagnosis    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

## 2025-07-29 ENCOUNTER — APPOINTMENT (OUTPATIENT)
Dept: NUTRITION | Facility: HOSPITAL | Age: 55
End: 2025-07-29
Payer: COMMERCIAL

## 2025-07-29 DIAGNOSIS — E78.9 ABNORMAL SERUM CHOLESTEROL: Primary | ICD-10-CM

## 2025-08-07 ENCOUNTER — APPOINTMENT (OUTPATIENT)
Dept: CARDIOLOGY | Facility: CLINIC | Age: 55
End: 2025-08-07
Payer: COMMERCIAL

## 2025-08-19 ENCOUNTER — APPOINTMENT (OUTPATIENT)
Dept: PAIN MEDICINE | Facility: CLINIC | Age: 55
End: 2025-08-19
Payer: COMMERCIAL

## 2025-08-19 VITALS
HEIGHT: 64 IN | RESPIRATION RATE: 18 BRPM | BODY MASS INDEX: 22.53 KG/M2 | WEIGHT: 132 LBS | DIASTOLIC BLOOD PRESSURE: 83 MMHG | SYSTOLIC BLOOD PRESSURE: 122 MMHG | HEART RATE: 96 BPM

## 2025-08-19 DIAGNOSIS — M25.50 POLYARTHRALGIA: Primary | ICD-10-CM

## 2025-08-19 DIAGNOSIS — G89.29 CHRONIC BILATERAL LOW BACK PAIN WITHOUT SCIATICA: ICD-10-CM

## 2025-08-19 DIAGNOSIS — M54.50 CHRONIC BILATERAL LOW BACK PAIN WITHOUT SCIATICA: ICD-10-CM

## 2025-08-19 PROCEDURE — 3008F BODY MASS INDEX DOCD: CPT | Performed by: PHYSICAL MEDICINE & REHABILITATION

## 2025-08-19 PROCEDURE — 99204 OFFICE O/P NEW MOD 45 MIN: CPT | Performed by: PHYSICAL MEDICINE & REHABILITATION

## 2025-08-19 PROCEDURE — G2211 COMPLEX E/M VISIT ADD ON: HCPCS | Performed by: PHYSICAL MEDICINE & REHABILITATION

## 2025-08-19 RX ORDER — DULOXETIN HYDROCHLORIDE 30 MG/1
30 CAPSULE, DELAYED RELEASE ORAL DAILY
Qty: 30 CAPSULE | Refills: 2 | Status: SHIPPED | OUTPATIENT
Start: 2025-08-19

## 2025-08-19 SDOH — SOCIAL STABILITY: SOCIAL NETWORK: SOCIAL ACTIVITY:: 4

## 2025-08-19 ASSESSMENT — PAIN SCALES - GENERAL: PAINLEVEL_OUTOF10: 7

## 2025-08-20 ENCOUNTER — LAB (OUTPATIENT)
Dept: LAB | Facility: HOSPITAL | Age: 55
End: 2025-08-20
Payer: COMMERCIAL

## 2025-08-20 LAB
ALBUMIN SERPL BCP-MCNC: 4.8 G/DL (ref 3.4–5)
ALP SERPL-CCNC: 68 U/L (ref 33–110)
ALT SERPL W P-5'-P-CCNC: 27 U/L (ref 7–45)
ANION GAP SERPL CALCULATED.3IONS-SCNC: 10 MMOL/L (ref 10–20)
AST SERPL W P-5'-P-CCNC: 22 U/L (ref 9–39)
BILIRUB SERPL-MCNC: 0.4 MG/DL (ref 0–1.2)
BUN SERPL-MCNC: 12 MG/DL (ref 6–23)
CALCIUM SERPL-MCNC: 9.8 MG/DL (ref 8.6–10.3)
CHLORIDE SERPL-SCNC: 104 MMOL/L (ref 98–107)
CO2 SERPL-SCNC: 30 MMOL/L (ref 21–32)
CREAT SERPL-MCNC: 0.8 MG/DL (ref 0.5–1.05)
EGFRCR SERPLBLD CKD-EPI 2021: 87 ML/MIN/1.73M*2
GLUCOSE SERPL-MCNC: 75 MG/DL (ref 74–99)
POTASSIUM SERPL-SCNC: 4.4 MMOL/L (ref 3.5–5.3)
PROT SERPL-MCNC: 7.8 G/DL (ref 6.4–8.2)
SODIUM SERPL-SCNC: 140 MMOL/L (ref 136–145)

## 2025-08-20 PROCEDURE — 80053 COMPREHEN METABOLIC PANEL: CPT

## 2025-08-22 LAB
ANA PAT SER IF-IMP: ABNORMAL
ANA SER QL IF: POSITIVE
ANA TITR SER IF: ABNORMAL TITER
CENTROMERE B AB SER-ACNC: ABNORMAL AI
CK SERPL-CCNC: 100 U/L (ref 21–240)
DSDNA AB SER-ACNC: 5 IU/ML
ENA JO1 AB SER IA-ACNC: ABNORMAL AI
ENA RNP AB SER-ACNC: ABNORMAL AI
ENA SCL70 AB SER IA-ACNC: ABNORMAL AI
ENA SM AB SER IA-ACNC: ABNORMAL AI
ENA SM+RNP AB SER IA-ACNC: ABNORMAL AI
ENA SS-A AB SER IA-ACNC: ABNORMAL AI
ENA SS-B AB SER IA-ACNC: ABNORMAL AI
ERYTHROCYTE [SEDIMENTATION RATE] IN BLOOD BY WESTERGREN METHOD: 2 MM/H
NUCLEOSOME AB SER IA-ACNC: ABNORMAL AI
RHEUMATOID FACT SERPL-ACNC: <10 IU/ML
RIBOSOMAL P AB SER-ACNC: ABNORMAL AI
URATE SERPL-MCNC: 4 MG/DL (ref 2.5–7)

## 2025-08-26 ENCOUNTER — TELEPHONE (OUTPATIENT)
Dept: PRIMARY CARE | Facility: CLINIC | Age: 55
End: 2025-08-26

## 2025-08-26 ENCOUNTER — APPOINTMENT (OUTPATIENT)
Dept: CARDIOLOGY | Facility: CLINIC | Age: 55
End: 2025-08-26
Payer: COMMERCIAL

## 2025-08-26 ENCOUNTER — APPOINTMENT (OUTPATIENT)
Dept: GASTROENTEROLOGY | Facility: CLINIC | Age: 55
End: 2025-08-26
Payer: COMMERCIAL

## 2025-08-26 DIAGNOSIS — G89.29 OTHER CHRONIC PAIN: ICD-10-CM

## 2025-09-08 ENCOUNTER — APPOINTMENT (OUTPATIENT)
Dept: PRIMARY CARE | Facility: CLINIC | Age: 55
End: 2025-09-08
Payer: COMMERCIAL

## 2025-09-29 ENCOUNTER — APPOINTMENT (OUTPATIENT)
Dept: RHEUMATOLOGY | Facility: CLINIC | Age: 55
End: 2025-09-29
Payer: COMMERCIAL

## 2025-10-07 ENCOUNTER — APPOINTMENT (OUTPATIENT)
Dept: DERMATOLOGY | Facility: CLINIC | Age: 55
End: 2025-10-07
Payer: COMMERCIAL